# Patient Record
Sex: FEMALE | Race: WHITE | NOT HISPANIC OR LATINO | Employment: FULL TIME | URBAN - METROPOLITAN AREA
[De-identification: names, ages, dates, MRNs, and addresses within clinical notes are randomized per-mention and may not be internally consistent; named-entity substitution may affect disease eponyms.]

---

## 2021-12-02 ENCOUNTER — EVALUATION (OUTPATIENT)
Dept: PHYSICAL THERAPY | Facility: CLINIC | Age: 56
End: 2021-12-02
Payer: COMMERCIAL

## 2021-12-02 VITALS — DIASTOLIC BLOOD PRESSURE: 84 MMHG | SYSTOLIC BLOOD PRESSURE: 126 MMHG

## 2021-12-02 DIAGNOSIS — M25.522 LEFT ELBOW PAIN: ICD-10-CM

## 2021-12-02 DIAGNOSIS — Z48.89 OTHER SPECIFIED AFTERCARE FOLLOWING SURGERY: ICD-10-CM

## 2021-12-02 DIAGNOSIS — S42.411S: Primary | ICD-10-CM

## 2021-12-02 PROBLEM — Z96.653 TOTAL KNEE REPLACEMENT STATUS, BILATERAL: Status: ACTIVE | Noted: 2021-12-02

## 2021-12-02 PROBLEM — I10 HYPERTENSION: Status: ACTIVE | Noted: 2021-12-02

## 2021-12-02 PROCEDURE — 97161 PT EVAL LOW COMPLEX 20 MIN: CPT | Performed by: PHYSICAL THERAPIST

## 2021-12-02 PROCEDURE — 97110 THERAPEUTIC EXERCISES: CPT | Performed by: PHYSICAL THERAPIST

## 2021-12-02 RX ORDER — FLUOXETINE HYDROCHLORIDE 40 MG/1
40 CAPSULE ORAL DAILY
COMMUNITY

## 2021-12-02 RX ORDER — LISINOPRIL 10 MG/1
10 TABLET ORAL DAILY
COMMUNITY

## 2021-12-02 RX ORDER — HYDROCHLOROTHIAZIDE 12.5 MG/1
12.5 CAPSULE, GELATIN COATED ORAL DAILY
COMMUNITY

## 2021-12-06 ENCOUNTER — OFFICE VISIT (OUTPATIENT)
Dept: PHYSICAL THERAPY | Facility: CLINIC | Age: 56
End: 2021-12-06
Payer: COMMERCIAL

## 2021-12-06 DIAGNOSIS — Z48.89 OTHER SPECIFIED AFTERCARE FOLLOWING SURGERY: ICD-10-CM

## 2021-12-06 DIAGNOSIS — S42.411S: Primary | ICD-10-CM

## 2021-12-06 DIAGNOSIS — M25.522 LEFT ELBOW PAIN: ICD-10-CM

## 2021-12-06 PROCEDURE — 97110 THERAPEUTIC EXERCISES: CPT | Performed by: PHYSICAL THERAPIST

## 2021-12-06 PROCEDURE — 97140 MANUAL THERAPY 1/> REGIONS: CPT | Performed by: PHYSICAL THERAPIST

## 2021-12-08 ENCOUNTER — OFFICE VISIT (OUTPATIENT)
Dept: PHYSICAL THERAPY | Facility: CLINIC | Age: 56
End: 2021-12-08
Payer: COMMERCIAL

## 2021-12-08 DIAGNOSIS — S42.411S: Primary | ICD-10-CM

## 2021-12-08 DIAGNOSIS — M25.522 LEFT ELBOW PAIN: ICD-10-CM

## 2021-12-08 DIAGNOSIS — Z48.89 OTHER SPECIFIED AFTERCARE FOLLOWING SURGERY: ICD-10-CM

## 2021-12-08 PROCEDURE — 97140 MANUAL THERAPY 1/> REGIONS: CPT | Performed by: PHYSICAL THERAPIST

## 2021-12-08 PROCEDURE — 97110 THERAPEUTIC EXERCISES: CPT | Performed by: PHYSICAL THERAPIST

## 2021-12-14 ENCOUNTER — APPOINTMENT (OUTPATIENT)
Dept: PHYSICAL THERAPY | Facility: CLINIC | Age: 56
End: 2021-12-14
Payer: COMMERCIAL

## 2021-12-16 ENCOUNTER — OFFICE VISIT (OUTPATIENT)
Dept: PHYSICAL THERAPY | Facility: CLINIC | Age: 56
End: 2021-12-16
Payer: COMMERCIAL

## 2021-12-16 DIAGNOSIS — Z48.89 OTHER SPECIFIED AFTERCARE FOLLOWING SURGERY: ICD-10-CM

## 2021-12-16 DIAGNOSIS — S42.411S: Primary | ICD-10-CM

## 2021-12-16 DIAGNOSIS — M25.522 LEFT ELBOW PAIN: ICD-10-CM

## 2021-12-16 PROCEDURE — 97110 THERAPEUTIC EXERCISES: CPT | Performed by: PHYSICAL THERAPIST

## 2021-12-16 PROCEDURE — 97140 MANUAL THERAPY 1/> REGIONS: CPT | Performed by: PHYSICAL THERAPIST

## 2021-12-16 PROCEDURE — 97530 THERAPEUTIC ACTIVITIES: CPT | Performed by: PHYSICAL THERAPIST

## 2021-12-23 ENCOUNTER — OFFICE VISIT (OUTPATIENT)
Dept: PHYSICAL THERAPY | Facility: CLINIC | Age: 56
End: 2021-12-23
Payer: COMMERCIAL

## 2021-12-23 DIAGNOSIS — S42.411S: Primary | ICD-10-CM

## 2021-12-23 DIAGNOSIS — Z48.89 OTHER SPECIFIED AFTERCARE FOLLOWING SURGERY: ICD-10-CM

## 2021-12-23 DIAGNOSIS — M25.522 LEFT ELBOW PAIN: ICD-10-CM

## 2021-12-23 PROCEDURE — 97140 MANUAL THERAPY 1/> REGIONS: CPT | Performed by: PHYSICAL THERAPIST

## 2021-12-23 PROCEDURE — 97110 THERAPEUTIC EXERCISES: CPT | Performed by: PHYSICAL THERAPIST

## 2021-12-29 ENCOUNTER — APPOINTMENT (OUTPATIENT)
Dept: PHYSICAL THERAPY | Facility: CLINIC | Age: 56
End: 2021-12-29
Payer: COMMERCIAL

## 2022-01-10 ENCOUNTER — OFFICE VISIT (OUTPATIENT)
Dept: PHYSICAL THERAPY | Facility: CLINIC | Age: 57
End: 2022-01-10
Payer: COMMERCIAL

## 2022-01-10 DIAGNOSIS — S42.411S: Primary | ICD-10-CM

## 2022-01-10 DIAGNOSIS — M25.522 LEFT ELBOW PAIN: ICD-10-CM

## 2022-01-10 DIAGNOSIS — Z48.89 OTHER SPECIFIED AFTERCARE FOLLOWING SURGERY: ICD-10-CM

## 2022-01-10 PROCEDURE — 97164 PT RE-EVAL EST PLAN CARE: CPT | Performed by: PHYSICAL THERAPIST

## 2022-01-10 PROCEDURE — 97140 MANUAL THERAPY 1/> REGIONS: CPT | Performed by: PHYSICAL THERAPIST

## 2022-01-10 PROCEDURE — 97110 THERAPEUTIC EXERCISES: CPT | Performed by: PHYSICAL THERAPIST

## 2022-01-10 PROCEDURE — 97530 THERAPEUTIC ACTIVITIES: CPT | Performed by: PHYSICAL THERAPIST

## 2022-01-10 NOTE — LETTER
2022    MD Michael Ramirez [address] [place] [address]    Patient: Merna Kraus   YOB: 1965   Date of Visit: 1/10/2022     Encounter Diagnosis     ICD-10-CM    1  Open fracture distal humerus, bicondylar (T-Y fracture), right, sequela  S42 411S    2  Left elbow pain  M25 522    3  Other specified aftercare following surgery  Z48 89        Dear Dr Mireles Filter:    Thank you for your recent referral of Merna Kraus  Please review the attached evaluation summary from [facility]'s recent visit  Please verify that you agree with the plan of care by signing the attached order  If you have any questions or concerns, please do not hesitate to call  I sincerely appreciate the opportunity to share in the care of one of your patients and hope to have another opportunity to work with you in the near future  Sincerely,    Jose Wood, PT      Referring Provider:      I certify that I have read the below Plan of Care and certify the need for these services furnished under this plan of treatment while under my care  MD Michael Ramirez [address] [place] [address]  Via Fax: [phone]          PT Re-Evaluation     Today's date: 1/10/2022  Patient name: Merna Kraus  : 1965  MRN: [id number]  Referring provider: Gaurav Dan MD  Dx:   Encounter Diagnosis     ICD-10-CM    1  Open fracture distal humerus, bicondylar (T-Y fracture), right, sequela  S42 411S    2  Left elbow pain  M25 522    3  Other specified aftercare following surgery  Z48 89        Start Time: 1635  Stop Time: 1720  Total time in clinic (min): 45 minutes    Assessment  Assessment details: 49year old woman S/P L elbow arthroscopy and debridement, loose bodies removed on 21  L elbow is pain free  Full AROM  Pt RTW on 22 without difficulty  Strength L shoulder/elbow /wrist 4 to 4+/5  L  = to R    Np pain with palpation, hypersensitivity resolved  Pt has resumed all pre-morbid activity  Sleeping well   "feels much better than before surgery  "  DASH 0% disability  Discontinue PT, continue with HEP  Discussed progression of resistive ex and patient given varying resistances of tband  Functional limitations: Pt has resumed all activity  Symptom irritability: highUnderstanding of Dx/Px/POC: excellent  Goals  STG- 4 weeks  1  I HEP (MET)  2  Decrease pain to 0-2/10 (MET)  3  Increase AROM L UE to WNL (MET)  4  Control edema L wrist and hand (MET)  5  RTW (MET)    LTG- 8 weeks  1  Restore strength L elbow/UE to 4+-5/5 in order to resume all pre-morbid activity (progressing)  2  Increase L  strength to % of R in order to resume all pre-morbid activity (MET)  3  Pt will be independent and unrestricted with minimal pain with use of L LE/elbow in all activities related to home, work, and leisure  (MET)  4  Improve DASH score to under 10% disability (MET)    Plan  Plan details: DC to HEP  Patient would benefit from: PT eval and skilled physical therapy  Planned modality interventions: cryotherapy and thermotherapy: hydrocollator packs  Planned therapy interventions: manual therapy, neuromuscular re-education, patient education, therapeutic exercise, therapeutic training and home exercise program  Frequency: 2x week  Treatment plan discussed with: patient        Subjective Evaluation    History of Present Illness  Date of onset: 5/14/2021  Date of surgery: 12/1/2021  Mechanism of injury: surgery  Mechanism of injury: Pt fractured L elbow 5/21  Pt states pain and lack of motion persisted  Pt underwnet surgical debridement loose body removal on 12/1/21  Pt arrived I PO dressing  Dressing extremely tight with hand tingling and  Redness at proximal edge of dressing  Pt referred fro outpatient hand therapy  1/10/22- Patient states L elbow is pain free  No clicking, pleased with progress            Recurrent probem    Quality of life: excellent    Pain  Current pain ratin  At best pain ratin  At worst pain ratin  Progression: improved    Social Support  Steps to enter house: yes  1  Stairs in house: no   Lives in: [place]  Lives with: spouse    Employment status: working (customer service at [employer])  Hand dominance: right      Diagnostic Tests  X-ray: abnormal  Treatments  Previous treatment: physical therapy  Current treatment: physical therapy  Patient Goals  Patient goals for therapy: return to work, independence with ADLs/IADLs, increased strength, decreased pain and increased motion          Objective     Observations     Left Elbow   Positive for incision  Additional Observation Details  4 scope portal incisions, well healed    Neurological Testing     Sensation     Elbow   Left Elbow   Intact: light touch    Right Elbow   Intact: light touch    Comments   Left light touch: all fingers numb and tingly, dressing too tight       Additional Neurological Details  Hypersensitivity resolved    Active Range of Motion   Left Shoulder   Flexion: 148 degrees   Abduction: 145 degrees     Right Shoulder   Normal active range of motion    Left Elbow   Flexion: 136 degrees   Extension: -6 degrees   Forearm supination: 85 degrees   Forearm pronation: 82 degrees     Right Elbow   Normal active range of motion  Elbow hyperextension    Left Wrist   Wrist flexion: 52 degrees   Wrist extension: 62 degrees     Strength/Myotome Testing     Left Shoulder     Planes of Motion   Flexion: 4   Abduction: 4     Right Shoulder   Normal muscle strength    Left Elbow   Flexion: 4+  Extension: 4    Right Elbow   Normal strength    Left Wrist/Hand   Wrist extension: 4+  Wrist flexion: 4     (2nd hand position)     Trial 1: 55    Trial 2: 53    Right Wrist/Hand   Normal wrist strength     (2nd hand position)     Trial 1: 55    Trial 2: 52    Additional Strength Details  Shoulder flx/abd L 4/5    Swelling   Left Elbow Girth Measurements   Joint line: 30 cm    Right Elbow Girth Measurements   Joint line: 30 cm    Left Wrist/Hand   Circumference MCP: 20 5 cm    Right Wrist/Hand   Circumference MCP: 19 cm    General Comments:      Elbow Comments   1/10/22- DASH 0% disability      Flowsheet Rows      Most Recent Value   PT/OT G-Codes    Current Score 98   Projected Score 69   Assessment Type Discharge             Precautions: none      Manuals 12/2 12/6 12/8 12/16 12/23 1/10       Incisional massage after suture removal - STM, sutures still in place  CRR STM, sutures in place    CRR STM, sutures removed  CRR STM  CRR STM   CRR                                     FOTO  FOTO/RE       Neuro Re-Ed             PO dressing removed, redress with dry gauze and COBAN CRR Re-dress with dry gauze and COBAN Re-dress bandaids -                                                                                       Ther Ex             Tendon glides-hook/full 2 x 10 20x/20x 20x/20x 20x/20x 10x/10x -       Wrist F/E in neutral with open hand and fist 20x 20x/20x 20x/20x 20x/20x 20x/20x 1# 20x/20x 2#       Elbow flx forearm neutral 20x 20x 3 positions 20x 3 positions 20x 3 positions 20x 1# 3 positions 20x 3 positions 3#       Sup/pron 10x open hand 10x fist 20x open, 20x fist 20x open hand , 20x fist 20x open hand and wih fist 20x 1# 20x 1# hammer       Shoulder flx/abd 10x/10x 20x/20x 20x/20x 20x/20x 20x/20x 1# 20x/20x 2#       Supine elbow ext - 20x 20x 20x 20x 1# 20x 2#       Protract/clock/counter   20x/20x/20x 20x/20x/20x 20x/20x/20x 20x all 1# -       Isometrics elbow all planes     10x all planes 10x all planes       Ther Activity             putty NV  60" -  2 min  abd/add 20x each set of fingers  pinch thumb to all 10x each HEP -       digiflex NV - Y/R /G/B 10x all, 10x each Y/R/G 10x all, 10x each Y/R/G 10x all, 10x each Y/R/G/B  10x all  10x each       tband shoulder/elbow     NV  shoulder/elbow flx  ext  20x GTB Modalities             prn -

## 2022-01-10 NOTE — PROGRESS NOTES
PT Re-Evaluation     Today's date: 1/10/2022  Patient name: Fatmata Estrella  : 1965  MRN: [id number]  Referring provider: Hu Desouza MD  Dx:   Encounter Diagnosis     ICD-10-CM    1  Open fracture distal humerus, bicondylar (T-Y fracture), right, sequela  S42 411S    2  Left elbow pain  M25 522    3  Other specified aftercare following surgery  Z48 89        Start Time: 1635  Stop Time: 1720  Total time in clinic (min): 45 minutes    Assessment  Assessment details: 49year old woman S/P L elbow arthroscopy and debridement, loose bodies removed on 21  L elbow is pain free  Full AROM  Pt RTW on 22 without difficulty  Strength L shoulder/elbow /wrist 4 to 4+/5  L  = to R  Np pain with palpation, hypersensitivity resolved  Pt has resumed all pre-morbid activity  Sleeping well   "feels much better than before surgery  "  DASH 0% disability  Discontinue PT, continue with HEP  Discussed progression of resistive ex and patient given varying resistances of tband  Functional limitations: Pt has resumed all activity  Symptom irritability: highUnderstanding of Dx/Px/POC: excellent  Goals  STG- 4 weeks  1  I HEP (MET)  2  Decrease pain to 0-2/10 (MET)  3  Increase AROM L UE to WNL (MET)  4  Control edema L wrist and hand (MET)  5  RTW (MET)    LTG- 8 weeks  1  Restore strength L elbow/UE to 4+-5/5 in order to resume all pre-morbid activity (progressing)  2  Increase L  strength to % of R in order to resume all pre-morbid activity (MET)  3  Pt will be independent and unrestricted with minimal pain with use of L LE/elbow in all activities related to home, work, and leisure  (MET)  4   Improve DASH score to under 10% disability (MET)    Plan  Plan details: DC to HEP  Patient would benefit from: PT eval and skilled physical therapy  Planned modality interventions: cryotherapy and thermotherapy: hydrocollator packs  Planned therapy interventions: manual therapy, neuromuscular re-education, patient education, therapeutic exercise, therapeutic training and home exercise program  Frequency: 2x week  Treatment plan discussed with: patient        Subjective Evaluation    History of Present Illness  Date of onset: 2021  Date of surgery: 2021  Mechanism of injury: surgery  Mechanism of injury: Pt fractured L elbow   Pt states pain and lack of motion persisted  Pt underwnet surgical debridement loose body removal on 21  Pt arrived I PO dressing  Dressing extremely tight with hand tingling and  Redness at proximal edge of dressing  Pt referred fro outpatient hand therapy  1/10/22- Patient states L elbow is pain free  No clicking, pleased with progress  Recurrent probem    Quality of life: excellent    Pain  Current pain ratin  At best pain ratin  At worst pain ratin  Progression: improved    Social Support  Steps to enter house: yes  1  Stairs in house: no   Lives in: [facility]  Lives with: spouse    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      Diagnostic Tests  X-ray: abnormal  Treatments  Previous treatment: physical therapy  Current treatment: physical therapy  Patient Goals  Patient goals for therapy: return to work, independence with ADLs/IADLs, increased strength, decreased pain and increased motion          Objective     Observations     Left Elbow   Positive for incision  Additional Observation Details  4 scope portal incisions, well healed    Neurological Testing     Sensation     Elbow   Left Elbow   Intact: light touch    Right Elbow   Intact: light touch    Comments   Left light touch: all fingers numb and tingly, dressing too tight       Additional Neurological Details  Hypersensitivity resolved    Active Range of Motion   Left Shoulder   Flexion: 148 degrees   Abduction: 145 degrees     Right Shoulder   Normal active range of motion    Left Elbow   Flexion: 136 degrees Extension: -6 degrees   Forearm supination: 85 degrees   Forearm pronation: 82 degrees     Right Elbow   Normal active range of motion  Elbow hyperextension    Left Wrist   Wrist flexion: 52 degrees   Wrist extension: 62 degrees     Strength/Myotome Testing     Left Shoulder     Planes of Motion   Flexion: 4   Abduction: 4     Right Shoulder   Normal muscle strength    Left Elbow   Flexion: 4+  Extension: 4    Right Elbow   Normal strength    Left Wrist/Hand   Wrist extension: 4+  Wrist flexion: 4     (2nd hand position)     Trial 1: 55    Trial 2: 53    Right Wrist/Hand   Normal wrist strength     (2nd hand position)     Trial 1: 55    Trial 2: 52    Additional Strength Details  Shoulder flx/abd L 4/5    Swelling   Left Elbow Girth Measurements   Joint line: 30 cm    Right Elbow Girth Measurements   Joint line: 30 cm    Left Wrist/Hand   Circumference MCP: 20 5 cm    Right Wrist/Hand   Circumference MCP: 19 cm    General Comments:      Elbow Comments   1/10/22- DASH 0% disability      Flowsheet Rows      Most Recent Value   PT/OT G-Codes    Current Score 98   Projected Score 69   Assessment Type Discharge             Precautions: none      Manuals 12/2 12/6 12/8 12/16 12/23 1/10       Incisional massage after suture removal - STM, sutures still in place  CRR STM, sutures in place    CRR STM, sutures removed  CRR STM  CRR STM   CRR                                     FOTO  FOTO/RE       Neuro Re-Ed             PO dressing removed, redress with dry gauze and COBAN CRR Re-dress with dry gauze and COBAN Re-dress bandaids -                                                                                       Ther Ex             Tendon glides-hook/full 2 x 10 20x/20x 20x/20x 20x/20x 10x/10x -       Wrist F/E in neutral with open hand and fist 20x 20x/20x 20x/20x 20x/20x 20x/20x 1# 20x/20x 2#       Elbow flx forearm neutral 20x 20x 3 positions 20x 3 positions 20x 3 positions 20x 1# 3 positions 20x 3 positions 3# Sup/pron 10x open hand 10x fist 20x open, 20x fist 20x open hand , 20x fist 20x open hand and wih fist 20x 1# 20x 1# hammer       Shoulder flx/abd 10x/10x 20x/20x 20x/20x 20x/20x 20x/20x 1# 20x/20x 2#       Supine elbow ext - 20x 20x 20x 20x 1# 20x 2#       Protract/clock/counter   20x/20x/20x 20x/20x/20x 20x/20x/20x 20x all 1# -       Isometrics elbow all planes     10x all planes 10x all planes       Ther Activity             putty NV  60" -  2 min  abd/add 20x each set of fingers  pinch thumb to all 10x each HEP -       digiflex NV - Y/R /G/B 10x all, 10x each Y/R/G 10x all, 10x each Y/R/G 10x all, 10x each Y/R/G/B  10x all  10x each       tband shoulder/elbow     NV  shoulder/elbow flx  ext  20x GTB                                 Modalities             prn -

## 2023-03-22 ENCOUNTER — HOSPITAL ENCOUNTER (INPATIENT)
Facility: HOSPITAL | Age: 58
LOS: 5 days | Discharge: HOME HEALTH | End: 2023-03-28
Attending: EMERGENCY MEDICINE | Admitting: HOSPITALIST

## 2023-03-22 DIAGNOSIS — K59.00 CONSTIPATION: ICD-10-CM

## 2023-03-22 DIAGNOSIS — M54.9 INTRACTABLE BACK PAIN: ICD-10-CM

## 2023-03-22 DIAGNOSIS — M54.50 LOW BACK PAIN: Primary | ICD-10-CM

## 2023-03-22 PROBLEM — M54.59 INTRACTABLE LOW BACK PAIN: Status: ACTIVE | Noted: 2023-03-22

## 2023-03-22 PROBLEM — N30.01 ACUTE CYSTITIS WITH HEMATURIA: Status: ACTIVE | Noted: 2023-03-22

## 2023-03-22 PROBLEM — B37.0 THRUSH, ORAL: Status: ACTIVE | Noted: 2023-03-22

## 2023-03-22 PROBLEM — F11.90 CHRONIC, CONTINUOUS USE OF OPIOIDS: Status: ACTIVE | Noted: 2023-03-22

## 2023-03-22 LAB
ANION GAP SERPL CALCULATED.3IONS-SCNC: 7 MMOL/L (ref 4–13)
BACTERIA UR QL AUTO: ABNORMAL /HPF
BASOPHILS # BLD AUTO: 0.03 THOUSANDS/ÂΜL (ref 0–0.1)
BASOPHILS NFR BLD AUTO: 0 % (ref 0–1)
BILIRUB UR QL STRIP: NEGATIVE
BUN SERPL-MCNC: 23 MG/DL (ref 5–25)
CALCIUM SERPL-MCNC: 10.1 MG/DL (ref 8.4–10.2)
CHLORIDE SERPL-SCNC: 99 MMOL/L (ref 96–108)
CLARITY UR: CLEAR
CO2 SERPL-SCNC: 29 MMOL/L (ref 21–32)
COLOR UR: YELLOW
CREAT SERPL-MCNC: 0.96 MG/DL (ref 0.6–1.3)
EOSINOPHIL # BLD AUTO: 0.08 THOUSAND/ÂΜL (ref 0–0.61)
EOSINOPHIL NFR BLD AUTO: 1 % (ref 0–6)
ERYTHROCYTE [DISTWIDTH] IN BLOOD BY AUTOMATED COUNT: 12.5 % (ref 11.6–15.1)
FLUAV RNA RESP QL NAA+PROBE: NEGATIVE
FLUBV RNA RESP QL NAA+PROBE: NEGATIVE
GFR SERPL CREATININE-BSD FRML MDRD: 66 ML/MIN/1.73SQ M
GLUCOSE SERPL-MCNC: 112 MG/DL (ref 65–140)
GLUCOSE UR STRIP-MCNC: NEGATIVE MG/DL
HCT VFR BLD AUTO: 39.7 % (ref 34.8–46.1)
HGB BLD-MCNC: 13 G/DL (ref 11.5–15.4)
HGB UR QL STRIP.AUTO: ABNORMAL
IMM GRANULOCYTES # BLD AUTO: 0.05 THOUSAND/UL (ref 0–0.2)
IMM GRANULOCYTES NFR BLD AUTO: 1 % (ref 0–2)
KETONES UR STRIP-MCNC: NEGATIVE MG/DL
LEUKOCYTE ESTERASE UR QL STRIP: ABNORMAL
LYMPHOCYTES # BLD AUTO: 1.55 THOUSANDS/ÂΜL (ref 0.6–4.47)
LYMPHOCYTES NFR BLD AUTO: 15 % (ref 14–44)
MCH RBC QN AUTO: 31.3 PG (ref 26.8–34.3)
MCHC RBC AUTO-ENTMCNC: 32.7 G/DL (ref 31.4–37.4)
MCV RBC AUTO: 96 FL (ref 82–98)
MONOCYTES # BLD AUTO: 0.85 THOUSAND/ÂΜL (ref 0.17–1.22)
MONOCYTES NFR BLD AUTO: 8 % (ref 4–12)
MUCOUS THREADS UR QL AUTO: ABNORMAL
NEUTROPHILS # BLD AUTO: 8.01 THOUSANDS/ÂΜL (ref 1.85–7.62)
NEUTS SEG NFR BLD AUTO: 75 % (ref 43–75)
NITRITE UR QL STRIP: POSITIVE
NON-SQ EPI CELLS URNS QL MICRO: ABNORMAL /HPF
NRBC BLD AUTO-RTO: 0 /100 WBCS
PH UR STRIP.AUTO: 6 [PH] (ref 4.5–8)
PLATELET # BLD AUTO: 163 THOUSANDS/UL (ref 149–390)
PMV BLD AUTO: 8.8 FL (ref 8.9–12.7)
POTASSIUM SERPL-SCNC: 5 MMOL/L (ref 3.5–5.3)
PROT UR STRIP-MCNC: ABNORMAL MG/DL
RBC # BLD AUTO: 4.15 MILLION/UL (ref 3.81–5.12)
RBC #/AREA URNS AUTO: ABNORMAL /HPF
RSV RNA RESP QL NAA+PROBE: NEGATIVE
SARS-COV-2 RNA RESP QL NAA+PROBE: NEGATIVE
SODIUM SERPL-SCNC: 135 MMOL/L (ref 135–147)
SP GR UR STRIP.AUTO: >=1.03 (ref 1–1.03)
UROBILINOGEN UR QL STRIP.AUTO: 0.2 E.U./DL
WBC # BLD AUTO: 10.57 THOUSAND/UL (ref 4.31–10.16)
WBC #/AREA URNS AUTO: ABNORMAL /HPF

## 2023-03-22 RX ORDER — HYDROMORPHONE HCL/PF 1 MG/ML
1 SYRINGE (ML) INJECTION ONCE
Status: COMPLETED | OUTPATIENT
Start: 2023-03-22 | End: 2023-03-22

## 2023-03-22 RX ORDER — DIAZEPAM 5 MG/1
5 TABLET ORAL EVERY 8 HOURS PRN
Status: DISCONTINUED | OUTPATIENT
Start: 2023-03-22 | End: 2023-03-28 | Stop reason: HOSPADM

## 2023-03-22 RX ORDER — KETOROLAC TROMETHAMINE 30 MG/ML
30 INJECTION, SOLUTION INTRAMUSCULAR; INTRAVENOUS ONCE
Status: COMPLETED | OUTPATIENT
Start: 2023-03-22 | End: 2023-03-22

## 2023-03-22 RX ORDER — DOCUSATE SODIUM 100 MG/1
100 CAPSULE, LIQUID FILLED ORAL 2 TIMES DAILY
Status: DISCONTINUED | OUTPATIENT
Start: 2023-03-22 | End: 2023-03-28 | Stop reason: HOSPADM

## 2023-03-22 RX ORDER — OXYCODONE HYDROCHLORIDE 5 MG/1
5 TABLET ORAL EVERY 4 HOURS PRN
Status: DISCONTINUED | OUTPATIENT
Start: 2023-03-22 | End: 2023-03-25

## 2023-03-22 RX ORDER — GABAPENTIN 300 MG/1
300 CAPSULE ORAL 3 TIMES DAILY
COMMUNITY
Start: 2023-03-19

## 2023-03-22 RX ORDER — KETOROLAC TROMETHAMINE 30 MG/ML
30 INJECTION, SOLUTION INTRAMUSCULAR; INTRAVENOUS EVERY 6 HOURS PRN
Status: DISPENSED | OUTPATIENT
Start: 2023-03-22 | End: 2023-03-24

## 2023-03-22 RX ORDER — FLUCONAZOLE 40 MG/ML
200 POWDER, FOR SUSPENSION ORAL DAILY
Status: DISCONTINUED | OUTPATIENT
Start: 2023-03-23 | End: 2023-03-22

## 2023-03-22 RX ORDER — HYDROMORPHONE HCL/PF 1 MG/ML
0.5 SYRINGE (ML) INJECTION EVERY 2 HOUR PRN
Status: DISCONTINUED | OUTPATIENT
Start: 2023-03-22 | End: 2023-03-27

## 2023-03-22 RX ORDER — GABAPENTIN 300 MG/1
300 CAPSULE ORAL DAILY
Status: DISCONTINUED | OUTPATIENT
Start: 2023-03-23 | End: 2023-03-28 | Stop reason: HOSPADM

## 2023-03-22 RX ORDER — FLUCONAZOLE 100 MG/1
100 TABLET ORAL DAILY
COMMUNITY
End: 2023-03-28

## 2023-03-22 RX ORDER — LISINOPRIL 10 MG/1
10 TABLET ORAL DAILY
Status: DISCONTINUED | OUTPATIENT
Start: 2023-03-23 | End: 2023-03-27

## 2023-03-22 RX ORDER — ENOXAPARIN SODIUM 100 MG/ML
40 INJECTION SUBCUTANEOUS DAILY
Status: DISCONTINUED | OUTPATIENT
Start: 2023-03-23 | End: 2023-03-28 | Stop reason: HOSPADM

## 2023-03-22 RX ORDER — ONDANSETRON 2 MG/ML
4 INJECTION INTRAMUSCULAR; INTRAVENOUS EVERY 6 HOURS PRN
Status: DISCONTINUED | OUTPATIENT
Start: 2023-03-22 | End: 2023-03-28 | Stop reason: HOSPADM

## 2023-03-22 RX ORDER — MAGNESIUM HYDROXIDE/ALUMINUM HYDROXICE/SIMETHICONE 120; 1200; 1200 MG/30ML; MG/30ML; MG/30ML
30 SUSPENSION ORAL EVERY 6 HOURS PRN
Status: DISCONTINUED | OUTPATIENT
Start: 2023-03-22 | End: 2023-03-28 | Stop reason: HOSPADM

## 2023-03-22 RX ORDER — ACETAMINOPHEN 325 MG/1
650 TABLET ORAL EVERY 6 HOURS PRN
Status: DISCONTINUED | OUTPATIENT
Start: 2023-03-22 | End: 2023-03-28 | Stop reason: HOSPADM

## 2023-03-22 RX ORDER — FLUCONAZOLE 100 MG/1
100 TABLET ORAL DAILY
Status: DISCONTINUED | OUTPATIENT
Start: 2023-03-23 | End: 2023-03-28 | Stop reason: HOSPADM

## 2023-03-22 RX ORDER — DIAZEPAM 2 MG/1
5 TABLET ORAL DAILY PRN
COMMUNITY
Start: 2023-03-19 | End: 2023-03-28

## 2023-03-22 RX ORDER — CEFTRIAXONE 2 G/50ML
2000 INJECTION, SOLUTION INTRAVENOUS EVERY 24 HOURS
Status: DISCONTINUED | OUTPATIENT
Start: 2023-03-22 | End: 2023-03-26 | Stop reason: SDUPTHER

## 2023-03-22 RX ORDER — HYDROXYCHLOROQUINE SULFATE 200 MG/1
200 TABLET, FILM COATED ORAL 2 TIMES DAILY
COMMUNITY

## 2023-03-22 RX ORDER — OXYCODONE HYDROCHLORIDE 5 MG/1
5 TABLET ORAL AS NEEDED
Status: ON HOLD | COMMUNITY
Start: 2023-03-19 | End: 2023-03-28 | Stop reason: SDUPTHER

## 2023-03-22 RX ORDER — BACLOFEN 10 MG/1
10 TABLET ORAL 3 TIMES DAILY
Status: DISCONTINUED | OUTPATIENT
Start: 2023-03-22 | End: 2023-03-28 | Stop reason: HOSPADM

## 2023-03-22 RX ORDER — HYDROXYCHLOROQUINE SULFATE 200 MG/1
200 TABLET, FILM COATED ORAL 2 TIMES DAILY
Status: DISCONTINUED | OUTPATIENT
Start: 2023-03-22 | End: 2023-03-28 | Stop reason: HOSPADM

## 2023-03-22 RX ADMIN — HYDROMORPHONE HYDROCHLORIDE 0.5 MG: 1 INJECTION, SOLUTION INTRAMUSCULAR; INTRAVENOUS; SUBCUTANEOUS at 23:42

## 2023-03-22 RX ADMIN — CEFTRIAXONE 2000 MG: 2 INJECTION, SOLUTION INTRAVENOUS at 23:01

## 2023-03-22 RX ADMIN — BACLOFEN 10 MG: 10 TABLET ORAL at 22:04

## 2023-03-22 RX ADMIN — HYDROMORPHONE HYDROCHLORIDE 1 MG: 1 INJECTION, SOLUTION INTRAMUSCULAR; INTRAVENOUS; SUBCUTANEOUS at 17:43

## 2023-03-22 RX ADMIN — HYDROMORPHONE HYDROCHLORIDE 1 MG: 1 INJECTION, SOLUTION INTRAMUSCULAR; INTRAVENOUS; SUBCUTANEOUS at 16:05

## 2023-03-22 RX ADMIN — DOCUSATE SODIUM 100 MG: 100 CAPSULE, LIQUID FILLED ORAL at 22:04

## 2023-03-22 RX ADMIN — KETOROLAC TROMETHAMINE 30 MG: 30 INJECTION, SOLUTION INTRAMUSCULAR; INTRAVENOUS at 16:04

## 2023-03-22 RX ADMIN — HYDROXYCHLOROQUINE SULFATE 200 MG: 200 TABLET ORAL at 23:01

## 2023-03-22 RX ADMIN — KETOROLAC TROMETHAMINE 30 MG: 30 INJECTION, SOLUTION INTRAMUSCULAR; INTRAVENOUS at 22:03

## 2023-03-22 NOTE — ED PROVIDER NOTES
History  Chief Complaint   Patient presents with   • Back Pain     Pt reports lower back pain since 2/1/23, pt reports she has been in and out of the hospital for severe lower back pain       History provided by:  Patient   used: No    Back Pain  Location:  Lumbar spine  Quality:  Aching  Pain severity:  Moderate  Pain is:  Same all the time  Onset quality:  Gradual  Duration:  1 month  Timing:  Intermittent  Progression:  Waxing and waning  Chronicity:  Recurrent  Context comment:  Lumbar back pain going on for past month, recent MRI with degenerative changes, seen Pain Management recently,  Relieved by:  Nothing  Worsened by:  Nothing  Ineffective treatments:  None tried  Associated symptoms: no abdominal pain, no bladder incontinence, no bowel incontinence, no chest pain, no dysuria, no fever, no headaches, no leg pain, no perianal numbness and no weakness    Risk factors comment:  Back pain      Prior to Admission Medications   Prescriptions Last Dose Informant Patient Reported? Taking? FLUoxetine (PROzac) 40 MG capsule   Yes No   Sig: Take 40 mg by mouth daily   hydrochlorothiazide (MICROZIDE) 12 5 mg capsule   Yes No   Sig: Take 12 5 mg by mouth daily   lisinopril (ZESTRIL) 10 mg tablet   Yes No   Sig: Take 10 mg by mouth daily      Facility-Administered Medications: None       History reviewed  No pertinent past medical history  History reviewed  No pertinent surgical history  History reviewed  No pertinent family history  I have reviewed and agree with the history as documented  E-Cigarette/Vaping     E-Cigarette/Vaping Substances     Social History     Tobacco Use   • Smoking status: Never   • Smokeless tobacco: Never       Review of Systems   Constitutional: Negative for chills and fever  HENT: Negative for facial swelling, sore throat and trouble swallowing  Eyes: Negative for pain and visual disturbance  Respiratory: Negative for cough and shortness of breath  Cardiovascular: Negative for chest pain and leg swelling  Gastrointestinal: Negative for abdominal pain, bowel incontinence, diarrhea, nausea and vomiting  Genitourinary: Negative for bladder incontinence, dysuria and flank pain  Musculoskeletal: Positive for back pain  Negative for neck pain and neck stiffness  Skin: Negative for pallor and rash  Allergic/Immunologic: Negative for environmental allergies and immunocompromised state  Neurological: Negative for dizziness, weakness and headaches  Hematological: Negative for adenopathy  Does not bruise/bleed easily  Psychiatric/Behavioral: Negative for agitation and behavioral problems  All other systems reviewed and are negative  Physical Exam  Physical Exam  Vitals and nursing note reviewed  Constitutional:       General: She is not in acute distress  Appearance: She is well-developed  HENT:      Head: Normocephalic and atraumatic  Eyes:      Extraocular Movements: Extraocular movements intact  Cardiovascular:      Rate and Rhythm: Normal rate and regular rhythm  Heart sounds: Normal heart sounds  Pulmonary:      Effort: Pulmonary effort is normal       Breath sounds: Normal breath sounds  Abdominal:      Palpations: Abdomen is soft  Tenderness: There is no abdominal tenderness  There is no guarding or rebound  Musculoskeletal:         General: No tenderness  Normal range of motion  Cervical back: Normal range of motion and neck supple  Right lower leg: No edema  Left lower leg: No edema  Comments: No focal lumbar spine tenderness or deformity, motor/sensory exam intact distally in bilateral lower extremities   Skin:     General: Skin is warm and dry  Neurological:      General: No focal deficit present  Mental Status: She is alert and oriented to person, place, and time     Psychiatric:         Mood and Affect: Mood normal          Behavior: Behavior normal          Vital Signs  ED Triage Vitals   Temperature Pulse Respirations Blood Pressure SpO2   03/22/23 1514 03/22/23 1514 03/22/23 1514 03/22/23 1514 03/22/23 1514   98 3 °F (36 8 °C) (!) 120 18 (!) 148/105 95 %      Temp Source Heart Rate Source Patient Position - Orthostatic VS BP Location FiO2 (%)   03/22/23 1514 03/22/23 1514 -- 03/22/23 1653 --   Oral Monitor  Left arm       Pain Score       03/22/23 1514       10 - Worst Possible Pain           Vitals:    03/22/23 1514 03/22/23 1653   BP: (!) 148/105 116/67   Pulse: (!) 120 102         Visual Acuity      ED Medications  Medications   HYDROmorphone (DILAUDID) injection 1 mg (1 mg Intravenous Given 3/22/23 1605)   ketorolac (TORADOL) injection 30 mg (30 mg Intravenous Given 3/22/23 1604)   HYDROmorphone (DILAUDID) injection 1 mg (1 mg Intravenous Given 3/22/23 1743)       Diagnostic Studies  Results Reviewed     Procedure Component Value Units Date/Time    Urine Microscopic [[id number]] Collected: 03/22/23 1722    Lab Status:  In process Specimen: Urine, Clean Catch Updated: 03/22/23 1730    Urine Macroscopic, POC [[id number]]  (Abnormal) Collected: 03/22/23 1722    Lab Status: Final result Specimen: Urine Updated: 03/22/23 1724     Color, UA Yellow     Clarity, UA Clear     pH, UA 6 0     Leukocytes, UA Trace     Nitrite, UA Positive     Protein, UA 30 (1+) mg/dl      Glucose, UA Negative mg/dl      Ketones, UA Negative mg/dl      Urobilinogen, UA 0 2 E U /dl      Bilirubin, UA Negative     Occult Blood, UA Moderate     Specific Gravity, UA >=1 030    Narrative:      CLINITEK RESULT    Basic metabolic panel [[id number]] Collected: 03/22/23 1602    Lab Status: Final result Specimen: Blood from Arm, Right Updated: 03/22/23 1625     Sodium 135 mmol/L      Potassium 5 0 mmol/L      Chloride 99 mmol/L      CO2 29 mmol/L      ANION GAP 7 mmol/L      BUN 23 mg/dL      Creatinine 0 96 mg/dL      Glucose 112 mg/dL      Calcium 10 1 mg/dL      eGFR 66 ml/min/1 73sq m     Narrative:      National Kid[employer] Disease Foundation guidelines for Chronic Kidney Disease (CKD):   •  Stage 1 with normal or high GFR (GFR > 90 mL/min/1 73 square meters)  •  Stage 2 Mild CKD (GFR = 60-89 mL/min/1 73 square meters)  •  Stage 3A Moderate CKD (GFR = 45-59 mL/min/1 73 square meters)  •  Stage 3B Moderate CKD (GFR = 30-44 mL/min/1 73 square meters)  •  Stage 4 Severe CKD (GFR = 15-29 mL/min/1 73 square meters)  •  Stage 5 End Stage CKD (GFR <15 mL/min/1 73 square meters)  Note: GFR calculation is accurate only with a steady state creatinine    CBC and differential [[id number]]  (Abnormal) Collected: 03/22/23 1602    Lab Status: Final result Specimen: Blood from Arm, Right Updated: 03/22/23 1612     WBC 10 57 Thousand/uL      RBC 4 15 Million/uL      Hemoglobin 13 0 g/dL      Hematocrit 39 7 %      MCV 96 fL      MCH 31 3 pg      MCHC 32 7 g/dL      RDW 12 5 %      MPV 8 8 fL      Platelets 976 Thousands/uL      nRBC 0 /100 WBCs      Neutrophils Relative 75 %      Immat GRANS % 1 %      Lymphocytes Relative 15 %      Monocytes Relative 8 %      Eosinophils Relative 1 %      Basophils Relative 0 %      Neutrophils Absolute 8 01 Thousands/µL      Immature Grans Absolute 0 05 Thousand/uL      Lymphocytes Absolute 1 55 Thousands/µL      Monocytes Absolute 0 85 Thousand/µL      Eosinophils Absolute 0 08 Thousand/µL      Basophils Absolute 0 03 Thousands/µL                  No orders to display              Procedures  Procedures         ED Course  ED Course as of 03/22/23 1815   Wed Mar 22, 2023   1650 WBC(!): 10 57   1650 Hemoglobin: 13 0   1650 Platelet Count: 721   1650 Sodium: 135   1650 Potassium: 5 0   1650 BUN: 23   1650 Creatinine: 0 96  Labs reviewed  [phone] Patient re-evaluated, still with significant pain, we will admit for pain control, observation  1730 Leukocytes, UA(!): Trace   1730 Nitrite, UA(!): Positive  Urine noted for trace leucs and nitrites       Note: Urine results discussed with Dr Walker Bruno, agree to hold off on Abx pending Micro/Cx results  Medical Decision Making  Patient is a 16-year-old male, comes in with acute on chronic back pain, patient states that she has worsening of her lumbar back pain since February last month, patient had recent MRI that showed multilevel degenerative changes with nerve impingement at right L3 and possible left L4, recently seen pain management at [facility], now comes with persistent/recurrent back pain, patient states that her last bowel movement was 1 week back which she attributes to the pain meds she has been taking, denies bowel or bladder incontinence, no numbness or tingling in extremities  On exam, patient is conscious, alert, vital signs are stable, no acute distress, abdomen is soft nontender, no focal lumbar spine tenderness, motor/sensory exam intact distally in bilateral lower extremities  Impression: Acute on chronic back pain, we will give pain meds  Low back pain: acute illness or injury  Amount and/or Complexity of Data Reviewed  Labs: ordered  Decision-making details documented in ED Course  Risk  Prescription drug management            Disposition  Final diagnoses:   Low back pain   Intractable back pain     Time reflects when diagnosis was documented in both MDM as applicable and the Disposition within this note     Time User Action Codes Description Comment    3/22/2023  3:55 PM Paralee Myrna Add [M54 50] Low back pain     3/22/2023  5:18 PM Anne Son [M54 9] Intractable back pain       ED Disposition     ED Disposition   Admit    Condition   Stable    Date/Time   Wed Mar 22, 2023  5:17 PM    Comment   Case was discussed with Dr April Kilgore and the patient's admission status was agreed to be Admission Status: observation status to the service of Dr April Kilgore            Follow-up Information    None         Patient's Medications   Discharge Prescriptions    No medications on file       No discharge procedures on file      PDMP Review     None          ED Provider  Electronically Signed by           Blair Casiano MD  03/22/23 [id number]

## 2023-03-22 NOTE — LETTER
[id number]  [address]  Dept: [phone]    March 28, 2023     Patient: Kimberley Fox   YOB: 1965   Date of Visit: 3/22/2023       To Whom it May Concern:    Kimberley Fox is under my professional care  She was seen in the hospital from 3/22/2023 to 03/28/23  She may return to work on 4/3 without limitations  If you have any questions or concerns, please don't hesitate to call           Sincerely,          Jignesh Tamazight, DO

## 2023-03-23 LAB
ANION GAP SERPL CALCULATED.3IONS-SCNC: 8 MMOL/L (ref 4–13)
BASOPHILS # BLD AUTO: 0.04 THOUSANDS/ÂΜL (ref 0–0.1)
BASOPHILS NFR BLD AUTO: 0 % (ref 0–1)
BUN SERPL-MCNC: 25 MG/DL (ref 5–25)
CALCIUM SERPL-MCNC: 10.2 MG/DL (ref 8.4–10.2)
CHLORIDE SERPL-SCNC: 97 MMOL/L (ref 96–108)
CO2 SERPL-SCNC: 30 MMOL/L (ref 21–32)
CREAT SERPL-MCNC: 0.86 MG/DL (ref 0.6–1.3)
EOSINOPHIL # BLD AUTO: 0.1 THOUSAND/ÂΜL (ref 0–0.61)
EOSINOPHIL NFR BLD AUTO: 1 % (ref 0–6)
ERYTHROCYTE [DISTWIDTH] IN BLOOD BY AUTOMATED COUNT: 12.4 % (ref 11.6–15.1)
GFR SERPL CREATININE-BSD FRML MDRD: 76 ML/MIN/1.73SQ M
GLUCOSE P FAST SERPL-MCNC: 98 MG/DL (ref 65–99)
GLUCOSE SERPL-MCNC: 98 MG/DL (ref 65–140)
HCT VFR BLD AUTO: 38.4 % (ref 34.8–46.1)
HGB BLD-MCNC: 12.5 G/DL (ref 11.5–15.4)
IMM GRANULOCYTES # BLD AUTO: 0.05 THOUSAND/UL (ref 0–0.2)
IMM GRANULOCYTES NFR BLD AUTO: 1 % (ref 0–2)
LYMPHOCYTES # BLD AUTO: 1.93 THOUSANDS/ÂΜL (ref 0.6–4.47)
LYMPHOCYTES NFR BLD AUTO: 21 % (ref 14–44)
MCH RBC QN AUTO: 31.6 PG (ref 26.8–34.3)
MCHC RBC AUTO-ENTMCNC: 32.6 G/DL (ref 31.4–37.4)
MCV RBC AUTO: 97 FL (ref 82–98)
MONOCYTES # BLD AUTO: 0.73 THOUSAND/ÂΜL (ref 0.17–1.22)
MONOCYTES NFR BLD AUTO: 8 % (ref 4–12)
NEUTROPHILS # BLD AUTO: 6.54 THOUSANDS/ÂΜL (ref 1.85–7.62)
NEUTS SEG NFR BLD AUTO: 69 % (ref 43–75)
NRBC BLD AUTO-RTO: 0 /100 WBCS
PLATELET # BLD AUTO: 191 THOUSANDS/UL (ref 149–390)
PMV BLD AUTO: 9.3 FL (ref 8.9–12.7)
POTASSIUM SERPL-SCNC: 4.4 MMOL/L (ref 3.5–5.3)
RBC # BLD AUTO: 3.96 MILLION/UL (ref 3.81–5.12)
SODIUM SERPL-SCNC: 135 MMOL/L (ref 135–147)
WBC # BLD AUTO: 9.39 THOUSAND/UL (ref 4.31–10.16)

## 2023-03-23 RX ADMIN — HYDROXYCHLOROQUINE SULFATE 200 MG: 200 TABLET ORAL at 16:11

## 2023-03-23 RX ADMIN — KETOROLAC TROMETHAMINE 30 MG: 30 INJECTION, SOLUTION INTRAMUSCULAR; INTRAVENOUS at 07:37

## 2023-03-23 RX ADMIN — CEFTRIAXONE 2000 MG: 2 INJECTION, SOLUTION INTRAVENOUS at 21:40

## 2023-03-23 RX ADMIN — LISINOPRIL 10 MG: 10 TABLET ORAL at 08:00

## 2023-03-23 RX ADMIN — DOCUSATE SODIUM 100 MG: 100 CAPSULE, LIQUID FILLED ORAL at 16:10

## 2023-03-23 RX ADMIN — HYDROMORPHONE HYDROCHLORIDE 0.5 MG: 1 INJECTION, SOLUTION INTRAMUSCULAR; INTRAVENOUS; SUBCUTANEOUS at 19:55

## 2023-03-23 RX ADMIN — HYDROXYCHLOROQUINE SULFATE 200 MG: 200 TABLET ORAL at 07:39

## 2023-03-23 RX ADMIN — GABAPENTIN 300 MG: 300 CAPSULE ORAL at 08:00

## 2023-03-23 RX ADMIN — HYDROMORPHONE HYDROCHLORIDE 0.5 MG: 1 INJECTION, SOLUTION INTRAMUSCULAR; INTRAVENOUS; SUBCUTANEOUS at 12:53

## 2023-03-23 RX ADMIN — BACLOFEN 10 MG: 10 TABLET ORAL at 07:39

## 2023-03-23 RX ADMIN — KETOROLAC TROMETHAMINE 30 MG: 30 INJECTION, SOLUTION INTRAMUSCULAR; INTRAVENOUS at 14:20

## 2023-03-23 RX ADMIN — DOCUSATE SODIUM 100 MG: 100 CAPSULE, LIQUID FILLED ORAL at 07:40

## 2023-03-23 RX ADMIN — OXYCODONE HYDROCHLORIDE 5 MG: 5 TABLET ORAL at 22:40

## 2023-03-23 RX ADMIN — HYDROMORPHONE HYDROCHLORIDE 0.5 MG: 1 INJECTION, SOLUTION INTRAMUSCULAR; INTRAVENOUS; SUBCUTANEOUS at 06:14

## 2023-03-23 RX ADMIN — OXYCODONE HYDROCHLORIDE 5 MG: 5 TABLET ORAL at 16:11

## 2023-03-23 RX ADMIN — ENOXAPARIN SODIUM 40 MG: 100 INJECTION SUBCUTANEOUS at 08:00

## 2023-03-23 RX ADMIN — BACLOFEN 10 MG: 10 TABLET ORAL at 16:11

## 2023-03-23 RX ADMIN — FLUCONAZOLE 100 MG: 100 TABLET ORAL at 08:00

## 2023-03-23 RX ADMIN — OXYCODONE HYDROCHLORIDE 5 MG: 5 TABLET ORAL at 10:21

## 2023-03-23 RX ADMIN — HYDROMORPHONE HYDROCHLORIDE 0.5 MG: 1 INJECTION, SOLUTION INTRAMUSCULAR; INTRAVENOUS; SUBCUTANEOUS at 03:10

## 2023-03-23 RX ADMIN — OXYCODONE HYDROCHLORIDE 5 MG: 5 TABLET ORAL at 05:19

## 2023-03-23 RX ADMIN — BACLOFEN 10 MG: 10 TABLET ORAL at 21:40

## 2023-03-23 RX ADMIN — DIAZEPAM 5 MG: 5 TABLET ORAL at 07:39

## 2023-03-23 NOTE — PLAN OF CARE
"  Problem: OCCUPATIONAL THERAPY ADULT  Goal: Performs self-care activities at highest level of function for planned discharge setting  See evaluation for individualized goals  Description: Treatment Interventions: ADL retraining, Functional transfer training, UE strengthening/ROM, Endurance training, Patient/family training, Equipment evaluation/education, Neuromuscular reeducation, Compensatory technique education, Energy conservation, Activityengagement          See flowsheet documentation for full assessment, interventions and recommendations  Note: Limitation: Decreased ADL status, Decreased UE strength, Decreased UE ROM, Decreased Safe judgement during ADL, Decreased endurance, Decreased self-care trans  Prognosis: Good  Assessment: Alaina Arguelles is a 56 y o  female who presents with severe lumbar spine pain  MRI 3/16 done by [facility] demonstrating \"Multilevel lumbar spondylosis  Spinal canal narrowing including a mild to moderate spinal canal stenosis at L2-L3  Multilevel neural foraminal narrowing including a moderate right L3-L4 neural foraminal stenosis  Impingement of the exiting right L3 nerve root  Possible impingement of the exiting left L4 nerve root  Small left foraminal protrusion at L5-S1\"  Pt with active orders for OT and up with assistance  Prior to admission, pt lives with  and mother in law in a ranch style home with 2 IHSAN with rails  Home has a walk in shower with seat and grab bars, raised toilets  Since Feb when back pain got worse, pt Has been ambulating with RW,  A with ADLS and IADLs  has been sleeping in adjustable bed  Drives  [employer]  reports 0 falls in the past 6 months  Upon evaluation, pt presenting below functional baseline with deficits noted in strength, activity tolerance, balance, safety awareness  which is limiting pts functional ability to complete ADLs/ IADLs, functional transfers and functional mobility   Pt current level of function: bed mobility - " supervision; transfers- supervision; functional mobility-supervision with RW ; UB dressing / bathing - supervision; LB dressing/ bathing- Dany; toileting - supervision  Pt would benefit from skilled OT 2-3x/wk to maximize functional independence  OT DC recommendation: OPPT       OT Discharge Recommendation: Home with outpatient rehabilitation

## 2023-03-23 NOTE — PLAN OF CARE
Problem: PHYSICAL THERAPY ADULT  Goal: Performs mobility at highest level of function for planned discharge setting  See evaluation for individualized goals  Description: Treatment/Interventions: Functional transfer training, LE strengthening/ROM, Therapeutic exercise, Endurance training, Patient/family training, Bed mobility, Gait training, Spoke to nursing, OT, Elevations          See flowsheet documentation for full assessment, interventions and recommendations  Note: Prognosis: Fair  Problem List: Decreased strength, Decreased endurance, Impaired balance, Decreased mobility, Pain  Assessment: Pt 56 y o  female w/ known opioid use and HTN presented to [address] on 3/22/2023 w/ severe LBP  Episode of LBP began early Feb '23 w/ multiple hospital visits w/ increasing pain  See EMR Pt admitted for Intractable low back pain, acute cystitis, and oral thrush  PT eval and tx order w/ up w/ A placed  PTA, pt was independent w/ all functional mobility w/ RW and lives w/  and mother-in-law in 1 level home  Upon evaluation, pt exhibits weakness, impaired balance, decreased endurance and pain as noted in flow sheet  Pt demonstrated bed mobility w/ Mod I and transfers w/ Supervision requiring verbal cuing for proper mechanics and safety  Pt was able to ambulate using Rolling Walker w/ Supervision   Observable gait deviations as noted in flowsheet  During ambulation, no gross LOB and no complaints of dizziness, lightheadedness or SOB  Ambulation limited by pain which increased w/ mobility and ambulation  The above mentioned impairments limit pt's ability for independent functional mobility hence will benefit from skilled PT during hospital stay to improve function  The patient's AM-PAC Basic Mobility Inpatient Short Form Raw Score is 19   A Raw score of greater than 16 suggests the patient may benefit from discharge to home   Please also refer to the recommendation of the Physical Therapist for safe discharge planning  PT will recommend home with outpatient rehabilitation upon d/c from acute care once medically managed to improve functional mobility to baseline  Education provided to pt regarding importance of PT  Continue to encourage mobilization w/ nursing including OOB for meals as tolerated to prevent further functional decline  Nursing notified  Pt tolerated session well  Pt at end of session, in stable condition, supine in bed with all needs within reach  Co-eval with OT necessary for pt's best interest and medical complexity  Barriers to Discharge: None     PT Discharge Recommendation: Home with outpatient rehabilitation    See flowsheet documentation for full assessment

## 2023-03-23 NOTE — H&P
"[address] 1965, 56 y o  female MRN: [id number]  Unit/Bed#: [address] [id number] Encounter: [id number]  Primary Care Provider: Mary Jo Reeves DO   Date and time admitted to hospital: 3/22/2023  3:17 PM    * Intractable low back pain  Assessment & Plan  · Patient with PMHx of intractable lower back pain followed outpatient by [facility] presents to the ED today with persistent low back pain radiating into bilateral sides, L>R initially 10/10, now 5/10  · Denies urinary/bowel incontinence, saddle paresthesias, leg numbness or weakness  · Patient recently admitted to [facility] for the same issue 3/16-3/19   · Patient reports recently seeing [facility] earlier in the week, who switched her medication regimen  · Weaning off gabapentin now taking 300mg once daily x 2 more days, initiated 300mg etodolac q8 hours, but patient reports only taking one dose of this medication so far, valium increased to 5mg prn spasm  · Recommending spinal ablation with consultation scheduled April 21  · MRI 3/16 done by [facility] demonstrating \"Multilevel lumbar spondylosis  Spinal canal narrowing including a mild to moderate spinal canal stenosis at L2-L3  Multilevel neural foraminal narrowing including a moderate right L3-L4 neural   foraminal stenosis  Impingement of the exiting right L3 nerve root  Possible impingement of the   exiting left L4 nerve root  Small left foraminal protrusion at L5-S1 which does not clearly impinge the   exiting left L5 nerve root  Diffuse nonspecific marrow heterogeneity  Differential considerations include   hematopoetic marrow hyperplasia related to anemia, prior chemotherapy, chronic hypoxia, or erythropoietin administration, although a marrow replacement process can appear similarly  Correlation with patient's history and clinical presentation recommended  \"  · Continue pain management   · Orthopedic surgery consulted  · Patient report pain so severe " "walks with walker at baseline, PT/OT consulted    Acute cystitis with hematuria  Assessment & Plan  · Patient reports that she notices some \"pink\" when she urinated earlier, but denies other symptoms such as dysuria, urgency, frequency, urinary retention  · Patient reports has had kidney stones in the past, but back pain her normal chronic issue, not flank pain, typical kidney stone related pain for her  · Last CT abdomen/pelvis 2/9/2022 demonstrating \"There are multiple small bilateral renal calculi  Largest   calculus in the right kidney is in the lower pole measuring 3 8 mm  Small calculi seen in the left kidney  No hydronephrosis  No hydroureter or ureteral calculus  \"  · WBC 10 57, afebrile  · Will initiate ceftriaxone, continue to monitor     Chronic, continuous use of opioids  Assessment & Plan  · PDMP reviewed   · Continue home regimen of 5mg valium and 5mg oxy  · Continue pain management     Thrush, oral  Assessment & Plan  · Patient reports she recently started on fluconazole once daily for issue   · She states she has been on two long tapers of steroids for her back pain, likely the cause   · Continue fluconazole     Hypertension  Assessment & Plan  · Continue home medications    VTE Pharmacologic Prophylaxis: VTE Score: 3 Moderate Risk (Score 3-4) - Pharmacological DVT Prophylaxis Ordered: enoxaparin (Lovenox)  Code Status: Level 1 - Full Code   Discussion with family: Patient declined call to   Anticipated Length of Stay: Patient will be admitted on an observation basis with an anticipated length of stay of less than 2 midnights secondary to intractable back pain      Total Time Spent on Date of Encounter in care of patient: 75 minutes This time was spent on one or more of the following: performing physical exam; counseling and coordination of care; obtaining or reviewing history; documenting in the medical record; reviewing/ordering tests, medications or procedures; communicating with " "other healthcare professionals and discussing with patient's family/caregivers  Chief Complaint: \"The pain is so severe, I don't think I can wait until my apartment in April\"    History of Present Illness:  Erasmo Bowen is a 56 y o  female who presents with severe lumbar spine pain  Patient reports that she has had severe lower back pain gradually worsening, but leading to repeat hospitalization x 2 months  Now the pain is so bad that she now walks with a walker at baseline  Patient was hospitalized at [facility] last week and then had follow-up with [facility] earlier this week who adjusted her medication regimen  They report that they think her pain is more arthritic in nature than nerve related and are currently weaning her gabapentin to etodolac  They also increased her Valium from 2 mg to 5 mg for muscle spasms and increased her Oxy codon from 2 mg to 5 mg as well  Patient reports that she has only taken 1 day worth of the etodolac, but the pain was unsustainable she decided to seek emergent care  She was seen by [facility] emergency room earlier today who gave her a pain cocktail and sent her home at that time  Patient reports that when she got home and these medications wore off the pain was too much so she decided to seek urgent care again  She reports that she has a follow-up with the [facility] at the end of April for a consultation for possible spinal ablation, but is worried that she cannot last until then  Patient reports that the pain is a 10 out of 10 and worse with movement  She reports that after receiving Toradol and Dilaudid it is now 5 out of 10 but increasing again  She states that her pain is central over her lumbar spine and radiates to her bilateral sides with it more consistently radiating to the left  She denies any bilateral leg weakness or numbness and just reports generalized weakness secondary to deconditioning from her chronic pain    She denies saddle paresthesias, " urinary or bowel incontinence  Patient reports that she does have some hematuria, but denies any dysuria, urgency, frequency, retention  Patient with history of kidney stones but reports that her pain is the same chronic back pain that she always has been on like her usual kidney stone pain  Patient also reports oral thrush likely secondary to her to prolonged steroid tapers in an attempt to control her back pain  Review of Systems:  Review of Systems   Constitutional: Negative for appetite change, chills, diaphoresis, fatigue and fever  HENT: Negative for congestion, rhinorrhea and sore throat  Eyes: Negative for photophobia and visual disturbance  Respiratory: Negative for cough, shortness of breath and wheezing  Cardiovascular: Negative for chest pain, palpitations and leg swelling  Gastrointestinal: Negative for abdominal distention, abdominal pain, blood in stool, constipation, diarrhea, nausea and vomiting  Genitourinary: Positive for hematuria  Negative for decreased urine volume, difficulty urinating, dysuria, flank pain and urgency  Musculoskeletal: Positive for back pain (Central lumbar spine radiating to both sides L>R) and gait problem (Patient walks with cane secondary to pain)  Negative for arthralgias, myalgias and neck stiffness  Skin: Negative for color change and rash  Neurological: Positive for weakness (generalized, deconditioning )  Negative for dizziness, seizures, syncope, light-headedness and headaches  Psychiatric/Behavioral: Negative for agitation, behavioral problems and confusion  The patient is not nervous/anxious  All other systems reviewed and are negative  Past Medical and Surgical History:   History reviewed  No pertinent past medical history  History reviewed  No pertinent surgical history  Meds/Allergies:  Prior to Admission medications    Medication Sig Start Date End Date Taking?  Authorizing Provider   diazepam (VALIUM) 2 mg tablet Take 5 mg by mouth daily as needed 3/19/23  Yes Historical Provider, MD   etodolac (LODINE) 300 MG capsule Take 300 mg by mouth every 8 (eight) hours   Yes Historical Provider, MD   fluconazole (DIFLUCAN) 100 mg tablet Take 100 mg by mouth daily   Yes Historical Provider, MD   gabapentin (NEURONTIN) 300 mg capsule Take 300 mg by mouth 3 (three) times a day Pt states she is weaning off this medication 3/19/23  Yes Historical Provider, MD   hydrochlorothiazide (MICROZIDE) 12 5 mg capsule Take 25 mg by mouth daily   Yes Historical Provider, MD   hydroxychloroquine (PLAQUENIL) 200 mg tablet Take 200 mg by mouth 2 (two) times a day   Yes Historical Provider, MD   lisinopril (ZESTRIL) 10 mg tablet Take 10 mg by mouth daily   Yes Historical Provider, MD   oxyCODONE (ROXICODONE) 5 immediate release tablet Take 5 mg by mouth if needed 3/19/23  Yes Historical Provider, MD   FLUoxetine (PROzac) 40 MG capsule Take 40 mg by mouth daily  Patient not taking: Reported on 3/22/2023    Historical Provider, MD     I have reviewed home medications using recent Epic encounter  Allergies: No Known Allergies    Social History:  Marital Status: /Civil Union   Patient Pre-hospital Living Situation: Home  Patient Pre-hospital Level of Mobility: walks with walker  Patient Pre-hospital Diet Restrictions: None  Substance Use History:   Social History     Substance and Sexual Activity   Alcohol Use None     Social History     Tobacco Use   Smoking Status Never   Smokeless Tobacco Never     Social History     Substance and Sexual Activity   Drug Use Not on file       Family History:  History reviewed  No pertinent family history      Physical Exam:     Vitals:   Blood Pressure: 146/85 (03/22/23 2144)  Pulse: 95 (03/22/23 2144)  Temperature: 98 1 °F (36 7 °C) (03/22/23 2144)  Temp Source: Oral (03/22/23 2144)  Respirations: 18 (03/22/23 2144)  Weight - Scale: 95 8 kg (211 lb 3 2 oz) (03/22/23 1514)  SpO2: 98 % (03/22/23 2144)    Physical Exam  Vitals and nursing note reviewed  Constitutional:       General: She is not in acute distress  Appearance: She is well-developed  She is obese  HENT:      Head: Normocephalic and atraumatic  Nose: Nose normal  No congestion  Mouth/Throat:      Mouth: Mucous membranes are moist       Pharynx: Oropharynx is clear  Eyes:      Conjunctiva/sclera: Conjunctivae normal    Cardiovascular:      Rate and Rhythm: Normal rate and regular rhythm  Heart sounds: Normal heart sounds  No murmur heard  No friction rub  No gallop  Pulmonary:      Effort: Pulmonary effort is normal  No respiratory distress  Breath sounds: Normal breath sounds  No wheezing, rhonchi or rales  Abdominal:      General: Bowel sounds are normal  There is no distension  Palpations: Abdomen is soft  Tenderness: There is no abdominal tenderness  Musculoskeletal:         General: Tenderness (lumbar spine ) present  Cervical back: Neck supple  Right lower leg: No edema  Left lower leg: No edema  Skin:     General: Skin is warm and dry  Capillary Refill: Capillary refill takes less than 2 seconds  Neurological:      General: No focal deficit present  Mental Status: She is alert and oriented to person, place, and time  Mental status is at baseline  Cranial Nerves: No cranial nerve deficit  Sensory: No sensory deficit  Motor: No weakness (Bilateral lower extremity strength 5/5)        Coordination: Coordination normal    Psychiatric:         Mood and Affect: Mood normal          Behavior: Behavior normal           Additional Data:     Lab Results:  Results from last 7 days   Lab Units 03/22/23  1602   WBC Thousand/uL 10 57*   HEMOGLOBIN g/dL 13 0   HEMATOCRIT % 39 7   PLATELETS Thousands/uL 163   NEUTROS PCT % 75   LYMPHS PCT % 15   MONOS PCT % 8   EOS PCT % 1     Results from last 7 days   Lab Units 03/22/23  1602   SODIUM mmol/L 135   POTASSIUM mmol/L 5 0   CHLORIDE mmol/L 99   CO2 mmol/L 29   BUN mg/dL 23   CREATININE mg/dL 0 96   ANION GAP mmol/L 7   CALCIUM mg/dL 10 1   GLUCOSE RANDOM mg/dL 112                       Lines/Drains:  Invasive Devices     Peripheral Intravenous Line  Duration           Peripheral IV 03/22/23 Right Antecubital <1 day                    Imaging: Reviewed radiology reports from this admission including: MRI spine  No orders to display       EKG and Other Studies Reviewed on Admission:   · EKG: No EKG obtained  ** Please Note: This note has been constructed using a voice recognition system   **

## 2023-03-23 NOTE — ASSESSMENT & PLAN NOTE
"· Patient with PMHx of intractable lower back pain followed outpatient by [facility] presents to the ED today with persistent low back pain radiating into bilateral sides, L>R initially 10/10, now 5/10  · Denies urinary/bowel incontinence, saddle paresthesias, leg numbness or weakness  · Patient recently admitted to [facility] for the same issue 3/16-3/19   · Patient reports recently seeing [facility] earlier in the week, who switched her medication regimen  · Weaning off gabapentin now taking 300mg once daily x 2 more days, initiated 300mg etodolac q8 hours, but patient reports only taking one dose of this medication so far, valium increased to 5mg prn spasm  · Recommending spinal ablation with consultation scheduled April 21  · MRI 3/16 done by [facility] demonstrating \"Multilevel lumbar spondylosis  Spinal canal narrowing including a mild to moderate spinal canal stenosis at L2-L3  Multilevel neural foraminal narrowing including a moderate right L3-L4 neural   foraminal stenosis  Impingement of the exiting right L3 nerve root  Possible impingement of the   exiting left L4 nerve root  Small left foraminal protrusion at L5-S1 which does not clearly impinge the   exiting left L5 nerve root  Diffuse nonspecific marrow heterogeneity  Differential considerations include   hematopoetic marrow hyperplasia related to anemia, prior chemotherapy, chronic hypoxia, or erythropoietin administration, although a marrow replacement process can appear similarly  Correlation with patient's history and clinical presentation recommended  \"  · Continue pain management   · Orthopedic surgery consulted  · Patient report pain so severe walks with walker at baseline, PT/OT consulted  "

## 2023-03-23 NOTE — UTILIZATION REVIEW
Initial Clinical Review    Observation 3/22 @ 1718 and changed to Inpatient on 3/23 @ 1642  Pt requiring continued stay for  Intractable low back pain and Acute cystitis with hematuria/ Iv antibiotics  Pain control  Admission: Date/Time/Statement:   Admission Orders (From admission, onward)     Ordered        03/23/23 1642  Inpatient Admission  Once            03/22/23 1718  Place in Observation  Once                      Orders Placed This Encounter   Procedures   • Inpatient Admission     Standing Status:   Standing     Number of Occurrences:   1     Order Specific Question:   Level of Care     Answer:   Med Surg [16]     Order Specific Question:   Estimated length of stay     Answer:   More than 2 Midnights     Order Specific Question:   Certification     Answer:   I certify that inpatient services are medically necessary for this patient for a duration of greater than two midnights  See H&P and MD Progress Notes for additional information about the patient's course of treatment  ED Arrival Information     Expected   -    Arrival   3/22/2023 14:58    Acuity   Urgent            Means of arrival   Wheelchair    Escorted by   Spouse    Service   Hospitalist    Admission type   Emergency            Arrival complaint   back pain            Chief Complaint   Patient presents with   • Back Pain     Pt reports lower back pain since 2/1/23, pt reports she has been in and out of the hospital for severe lower back pain       Initial Presentation: 56 y o  female to ED presents for severe lumbar spine pain  Per pt, she has had severe lower back pain gradually worsening, but leading to repeat hospitalization x 2 months  Now the pain is so bad that she now walks with a walker at baseline  Patient was hospitalized at [facility] last week and then had follow-up with [facility] earlier this week who adjusted her medication regimen    They report that they think her pain is more arthritic in nature than nerve related and "are currently weaning her gabapentin to etodolac  They also increased her Valium from 2 mg to 5 mg for muscle spasms and increased her Oxy codon from 2 mg to 5 mg as well  Patient reports that she has only taken 1 day worth of the etodolac, but the pain was unsustainable  Seen at [facility] ED earlier today, given pain cocktail and sent home  Pain meds wore off at home and she went to Urgent care again  Pain 10/10 and worse with movement  Given Toradol and Dilaudid in ED with some improvement  PMH for HTN, Chronic, continuous use of opioids  Admit Observation level of care for Intractable low back pain and Acute cystitis with hematuria  Oral Thrush - recently started on fluconazole daily and continue  Recommending spinal ablation with consultation scheduled April 21  Orthopedic consult  Pain control  Start Iv antibiotics  MRI 3/16 at [facility] demonstrating \"Multilevel lumbar spondylosis  Spinal canal narrowing including a mild to moderate spinal canal stenosis at L2-L3  Multilevel neural foraminal narrowing including a moderate right L3-L4 neural   foraminal stenosis  Impingement of the exiting right L3 nerve root  Possible impingement of the exiting left L4 nerve root  Small left foraminal protrusion at L5-S1 which does not clearly impinge the exiting left L5 nerve root  Diffuse nonspecific marrow heterogeneity  Differential considerations include hematopoetic marrow hyperplasia related to anemia, prior chemotherapy, chronic hypoxia, or erythropoietin administration, although a marrow replacement process can appear similarly  Correlation with patient's history and clinical presentation recommended  \"    3/23 Changed to Inpatient status  Orthopedic cons; Acute on chronic lumbar back pain  If pt continues with uncontrolled pain tomorrow, order lumbar CT scan  Otherwise can f/u outpt  Progress notes; Treated with steroid injection and that lasted for about 2 years and 6 months  Pt with severe pain   May not be able to " fix this  Anything we could do right now would be a bridge towards getting more definitive care  Pt has a video consultation with the [facility] spine [place] tomorrow which she can do at the bedside  Will see if we can get her pain better in the meantime  Date: 3/24  Day 2:  Per Orthopedic; Pain more intense today than yesterday per pt  It is all located central and right lower back  Movement worsens symptom  No bowel or bladder incontinence, no saddle anesthesia  CT Lumbar spine ordered  She has a phone conference at [facility]  Recommend muscle relaxant and course of oral steroids if able medically  Progress notes; Pt has bad morning, and states it was actually worse than when she initially came in  Started on prednisone  Continue Iv antibiotics  Pt unable to walk though   States pain severe with standing up          ED Triage Vitals   Temperature Pulse Respirations Blood Pressure SpO2   03/22/23 1514 03/22/23 1514 03/22/23 1514 03/22/23 1514 03/22/23 1514   98 3 °F (36 8 °C) (!) 120 18 (!) 148/105 95 %      Temp Source Heart Rate Source Patient Position - Orthostatic VS BP Location FiO2 (%)   03/22/23 1514 03/22/23 1514 03/22/23 1910 03/22/23 1653 --   Oral Monitor Sitting Left arm       Pain Score       03/22/23 1514       10 - Worst Possible Pain          Wt Readings from Last 1 Encounters:   03/22/23 95 8 kg (211 lb 3 2 oz)     Additional Vital Signs:   03/24/23 15:31:07 97 5 °F (36 4 °C) 78 18 116/80 92 92 % -- --   03/24/23 07:47:08 97 6 °F (36 4 °C) 86 20 115/81 92 96 % -- --   03/23/23 22:18:22 98 9 °F (37 2 °C) 93 22 117/82 94 91 % None (Room air) --   03/23/23 15:07:27 99 3 °F (37 4 °C) 89 16 117/82 94 88 %   Abnormal  -- --   03/23/23 07:42:44 98 5 °F (36 9 °C) 87 17 119/81 94 95 % -- --     03/23/23 07:42:44 98 5 °F (36 9 °C) 87 17 119/81 94 95 % -- --   03/22/23 21:44:44 98 1 °F (36 7 °C) 95 18 146/85 105 98 % None (Room air) Lying   03/22/23 1910 -- 85 16 113/89 -- 96 % None (Room air) Sitting   03/22/23 1653 -- 102 18 116/67 -- 97 % None (Room air)      Pertinent Labs/Diagnostic Test Results:   CT spine lumbar wo contrast   Final Result by Heather Campbell MD (03/24 1145)      1  Mild levoscoliosis, apex at L3       2  Multilevel degenerative disc disease with mild central canal narrowing at the L2-3 and L4-5 levels  3  Tiny nonobstructive right renal calculus           Workstation performed: [id number]           Results from last 7 days   Lab Units 03/22/23  1831   SARS-COV-2  Negative     Results from last 7 days   Lab Units 03/23/23  0522 03/22/23  1602   WBC Thousand/uL 9 39 10 57*   HEMOGLOBIN g/dL 12 5 13 0   HEMATOCRIT % 38 4 39 7   PLATELETS Thousands/uL 191 163   NEUTROS ABS Thousands/µL 6 54 8 01*         Results from last 7 days   Lab Units 03/23/23  0522 03/22/23  1602   SODIUM mmol/L 135 135   POTASSIUM mmol/L 4 4 5 0   CHLORIDE mmol/L 97 99   CO2 mmol/L 30 29   ANION GAP mmol/L 8 7   BUN mg/dL 25 23   CREATININE mg/dL 0 86 0 96   EGFR ml/min/1 73sq m 76 66   CALCIUM mg/dL 10 2 10 1             Results from last 7 days   Lab Units 03/23/23  0522 03/22/23  1602   GLUCOSE RANDOM mg/dL 98 112       Results from last 7 days   Lab Units 03/22/23  1722   CLARITY UA  Clear   COLOR UA  Yellow   SPEC GRAV UA  >=1 030   PH UA  6 0   GLUCOSE UA mg/dl Negative   KETONES UA mg/dl Negative   BLOOD UA  Moderate*   PROTEIN UA mg/dl 30 (1+)*   NITRITE UA  Positive*   BILIRUBIN UA  Negative   UROBILINOGEN UA E U /dl 0 2   LEUKOCYTES UA  Trace*   WBC UA /hpf 30-50*   RBC UA /hpf 30-50*   BACTERIA UA /hpf Innumerable*   EPITHELIAL CELLS WET PREP /hpf Moderate*   MUCUS THREADS  Occasional*     Results from last 7 days   Lab Units 03/22/23  1831   INFLUENZA A PCR  Negative   INFLUENZA B PCR  Negative   RSV PCR  Negative       ED Treatment:   Medication Administration from 03/22/2023 1458 to 03/22/2023 2136       Date/Time Order Dose Route Action     03/22/2023 1605 EDT HYDROmorphone (DILAUDID) injection 1 mg 1 mg Intravenous Given     03/22/2023 1604 EDT ketorolac (TORADOL) injection 30 mg 30 mg Intravenous Given     03/22/2023 1743 EDT HYDROmorphone (DILAUDID) injection 1 mg 1 mg Intravenous Given        History reviewed  No pertinent past medical history  Present on Admission:  • Hypertension      Admitting Diagnosis: Low back pain [M54 50]  Back pain [M54 9]  Intractable back pain [M54 9]  Age/Sex: 56 y o  female     Admission Orders:  Scheduled Medications:  baclofen, 10 mg, Oral, TID  cefTRIAXone, 2,000 mg, Intravenous, Q24H  docusate sodium, 100 mg, Oral, BID  enoxaparin, 40 mg, Subcutaneous, Daily  fluconazole, 100 mg, Oral, Daily  gabapentin, 300 mg, Oral, Daily  hydroxychloroquine, 200 mg, Oral, BID  lisinopril, 10 mg, Oral, Daily  predniSONE, 60 mg, Oral, Daily      Continuous IV Infusions: None     PRN Meds:  acetaminophen, 650 mg, Oral, Q6H PRN  aluminum-magnesium hydroxide-simethicone, 30 mL, Oral, Q6H PRN  diazepam, 5 mg, Oral, Q8H PRN 3/22 x1, 3/23 x4, 3/24 x3  HYDROmorphone, 0 5 mg, Intravenous, Q2H PRN 3/23 x4, 3/24 x4  ketorolac, 30 mg, Intravenous, Q6H PRN 3/22 x1, 3/23 x2, 3/24 x3  ondansetron, 4 mg, Intravenous, Q6H PRN  oxyCODONE, 5 mg, Oral, Q4H PRN 3/23 x4, 3/24 x3        IP CONSULT TO ORTHOPEDIC SURGERY    Network Utilization Review Department  ATTENTION: Please call with any questions or concerns to [phone] and carefully listen to the prompts so that you are directed to the right person  All voicemails are confidential   Shu Lemons all requests for admission clinical reviews, approved or denied determinations and any other requests to dedicated fax number below belonging to the campus where the patient is receiving treatment   List of dedicated fax numbers for the Facilities:  [address] [place] [address] DENIALS (Administrative/Medical Necessity) [phone]   [address] (Maternity/NICU/Pediatrics) [phone]   [address] [place] [address] [address] [place] [phone]   [facility] [address] [phone]   [address] [place] [address] [facility] [address] [place] [address] 310 [facility] 134 545 [facility] [phone]

## 2023-03-23 NOTE — PLAN OF CARE
Problem: Potential for Falls  Goal: Patient will remain free of falls  Description: INTERVENTIONS:  - Educate patient/family on patient safety including physical limitations  - Instruct patient to call for assistance with activity   - Consult OT/PT to assist with strengthening/mobility   - Keep Call bell within reach  - Keep bed low and locked with side rails adjusted as appropriate  - Keep care items and personal belongings within reach  - Initiate and maintain comfort rounds  - Make Fall Risk Sign visible to staff  - Offer Toileting every  Hours, in advance of need  - Initiate/Maintain alarm  - Obtain necessary fall risk management equipment:   - Apply yellow socks and bracelet for high fall risk patients  - Consider moving patient to room near nurses station  Outcome: Progressing     Problem: PAIN - ADULT  Goal: Verbalizes/displays adequate comfort level or baseline comfort level  Description: Interventions:  - Encourage patient to monitor pain and request assistance  - Assess pain using appropriate pain scale  - Administer analgesics based on type and severity of pain and evaluate response  - Implement non-pharmacological measures as appropriate and evaluate response  - Consider cultural and social influences on pain and pain management  - Notify physician/advanced practitioner if interventions unsuccessful or patient reports new pain  Outcome: Progressing     Problem: INFECTION - ADULT  Goal: Absence or prevention of progression during hospitalization  Description: INTERVENTIONS:  - Assess and monitor for signs and symptoms of infection  - Monitor lab/diagnostic results  - Monitor all insertion sites, i e  indwelling lines, tubes, and drains  - Monitor endotracheal if appropriate and nasal secretions for changes in amount and color  - [place] appropriate cooling/warming therapies per order  - Administer medications as ordered  - Instruct and encourage patient and family to use good hand hygiene technique  - Identify and instruct in appropriate isolation precautions for identified infection/condition  Outcome: Progressing     Problem: SAFETY ADULT  Goal: Maintain or return to baseline ADL function  Description: INTERVENTIONS:  -  Assess patient's ability to carry out ADLs; assess patient's baseline for ADL function and identify physical deficits which impact ability to perform ADLs (bathing, care of mouth/teeth, toileting, grooming, dressing, etc )  - Assess/evaluate cause of self-care deficits   - Assess range of motion  - Assess patient's mobility; develop plan if impaired  - Assess patient's need for assistive devices and provide as appropriate  - Encourage maximum independence but intervene and supervise when necessary  - Involve family in performance of ADLs  - Assess for home care needs following discharge   - Consider OT consult to assist with ADL evaluation and planning for discharge  - Provide patient education as appropriate  Outcome: Progressing     Problem: GENITOURINARY - ADULT  Goal: Maintains or returns to baseline urinary function  Description: INTERVENTIONS:  - Assess urinary function  - Encourage oral fluids to ensure adequate hydration if ordered  - Administer IV fluids as ordered to ensure adequate hydration  - Administer ordered medications as needed  - Offer frequent toileting  - Follow urinary retention protocol if ordered  Outcome: Progressing     Problem: SKIN/TISSUE INTEGRITY - ADULT  Goal: Skin Integrity remains intact(Skin Breakdown Prevention)  Description: Assess:  -Perform Dejuan assessment every   -Clean and moisturize skin every   -Inspect skin when repositioning, toileting, and assisting with ADLS  -Assess under medical devices such as  every   -Assess extremities for adequate circulation and sensation     Bed Management:  -Have minimal linens on bed & keep smooth, unwrinkled  -Change linens as needed when moist or perspiring  -Avoid sitting or lying in one position for more than  hours while in bed  -Keep HOB at degrees     Toileting:  -Offer bedside commode  -Assess for incontinence every   -Use incontinent care products after each incontinent episode such as     Activity:  -Mobilize patient  times a day  -Encourage activity and walks on unit  -Encourage or provide ROM exercises   -Turn and reposition patient every  Hours  -Use appropriate equipment to lift or move patient in bed  -Instruct/ Assist with weight shifting every  when out of bed in chair  -Consider limitation of chair time  hour intervals    Skin Care:  -Avoid use of baby powder, tape, friction and shearing, hot water or constrictive clothing  -Relieve pressure over bony prominences using   -Do not massage red bony areas    Next Steps:  -Teach patient strategies to minimize risks such as    -Consider consults to  interdisciplinary teams such  Outcome: Progressing     Problem: MUSCULOSKELETAL - ADULT  Goal: Maintain or return mobility to safest level of function  Description: INTERVENTIONS:  - Assess patient's ability to carry out ADLs; assess patient's baseline for ADL function and identify physical deficits which impact ability to perform ADLs (bathing, care of mouth/teeth, toileting, grooming, dressing, etc )  - Assess/evaluate cause of self-care deficits   - Assess range of motion  - Assess patient's mobility  - Assess patient's need for assistive devices and provide as appropriate  - Encourage maximum independence but intervene and supervise when necessary  - Involve family in performance of ADLs  - Assess for home care needs following discharge   - Consider OT consult to assist with ADL evaluation and planning for discharge  - Provide patient education as appropriate  Outcome: Progressing  Goal: Maintain proper alignment of affected body part  Description: INTERVENTIONS:  - Support, maintain and protect limb and body alignment  - Provide patient/ family with appropriate education  Outcome: Progressing     Problem: MOBILITY - ADULT  Goal: Maintain or return to baseline ADL function  Description: INTERVENTIONS:  -  Assess patient's ability to carry out ADLs; assess patient's baseline for ADL function and identify physical deficits which impact ability to perform ADLs (bathing, care of mouth/teeth, toileting, grooming, dressing, etc )  - Assess/evaluate cause of self-care deficits   - Assess range of motion  - Assess patient's mobility; develop plan if impaired  - Assess patient's need for assistive devices and provide as appropriate  - Encourage maximum independence but intervene and supervise when necessary  - Involve family in performance of ADLs  - Assess for home care needs following discharge   - Consider OT consult to assist with ADL evaluation and planning for discharge  - Provide patient education as appropriate  Outcome: Progressing  Goal: Maintains/Returns to pre admission functional level  Description: INTERVENTIONS:  - Perform BMAT or MOVE assessment daily    - Set and communicate daily mobility goal to care team and patient/family/caregiver  - Collaborate with rehabilitation services on mobility goals if consulted  - Perform Range of Motion  times a day  - Reposition patient every  hours    - Dangle patient  times a day  - Stand patient  times a day  - Ambulate patient  times a day  - Out of bed to chair  times a day   - Out of bed for meals  times a day  - Out of bed for toileting  - Record patient progress and toleration of activity level   Outcome: Progressing

## 2023-03-23 NOTE — CONSULTS
Orthopedics   Michael Candelario 56 y o  female MRN: [id number]  Unit/Bed#: [address] 212-02      Chief Complaint:   Back Pain    HPI:   56 y  o female community ambulator complaining of Lumbar back pain  She has a significant history of intractable lower back pain which has been ongoing for 8-10 years without any known inciting injury  Her back pain flares frequently and she will require pain medicine and prednisone to relieve the pain  Her most recent lumbar pain flare-up began on 2/1 without any known injury  She says she has been hospitalized maybe 7-8 times since that date because the pain is so bad it prevents her from walking  In the past week she has started using a walker because of the pain  She sleeps in her living room in a hospital bed with adjustable head and feet height  Her last prior hospitalization was at [facility] 3/16-3/19 for the same issue  She recently started following as an outpatient at the [facility]  They have changed her pain regimen from gabapentin to etodolac with Valium PRN muscle spasms  They are recommending spinal ablation with virtual consultation scheduled tomorrow  She has had kidney stones in the past but says this feels very different from her usual kidney stone pain  Usually that pain eventually radiates into the groin, but these just seems to radiates into her back muscles  She did have a CT scan at [facility] which revealed bilateral nonobstructive renal calculi  She is currently being treated for an incidentally found non-symptomatic UTI  She is receiving antibiotics  She denies urinary/bowel incontinence, saddle paresthesias, or leg numbness or weakness  She has had constipation for maybe 7 days, but she thinks this is from all the pain medication and the fact that she has not had an appetite because of the pain  She denies fevers or chills  She denies respiratory symptoms  She says she is desperate for pain relief    She says she cannot walk and can barely sleep   She is hoping we can do something to help her  Review Of Systems:   · Skin: Normal  · Neuro: See HPI  · Musculoskeletal: See HPI  · 14 point review of systems negative except as stated above     Past Medical History:   History reviewed  No pertinent past medical history  Past Surgical History:   History reviewed  No pertinent surgical history  Family History:  Family history reviewed and non-contributory  History reviewed  No pertinent family history      Social History:  Social History     Socioeconomic History   • Marital status: /Civil Union     Spouse name: None   • Number of children: None   • Years of education: None   • Highest education level: None   Occupational History   • None   Tobacco Use   • Smoking status: Never   • Smokeless tobacco: Never   Substance and Sexual Activity   • Alcohol use: None   • Drug use: None   • Sexual activity: None   Other Topics Concern   • None   Social History Narrative   • None     Social Determinants of Health     Financial Resource Strain: Not on file   Food Insecurity: Not on file   Transportation Needs: Not on file   Physical Activity: Not on file   Stress: Not on file   Social Connections: Not on file   Intimate Partner Violence: Not on file   Housing Stability: Not on file       Allergies:   No Known Allergies        Labs:  0   Lab Value Date/Time    HCT 38 4 03/23/2023 0522    HCT 39 7 03/22/2023 1602    HGB 12 5 03/23/2023 0522    HGB 13 0 03/22/2023 1602    WBC 9 39 03/23/2023 0522    WBC 10 57 (H) 03/22/2023 1602       Meds:    Current Facility-Administered Medications:   •  acetaminophen (TYLENOL) tablet 650 mg, 650 mg, Oral, Q6H PRN, Mayme Horse, PA-C  •  aluminum-magnesium hydroxide-simethicone (MYLANTA) oral suspension 30 mL, 30 mL, Oral, Q6H PRN, Mayme Horse, PA-C  •  baclofen tablet 10 mg, 10 mg, Oral, TID, Mayme Horse, PA-C, 10 mg at 03/23/23 6406  •  cefTRIAXone (ROCEPHIN) IVPB (premix in dextrose) 2,000 mg 50 mL, 2,000 mg, Intravenous, Q24H, Jarbidge Prime, PA-C, Last Rate: 100 mL/hr at 03/22/23 2301, 2,000 mg at 03/22/23 2301  •  diazepam (VALIUM) tablet 5 mg, 5 mg, Oral, Q8H PRN, Jered Prime, PA-C, 5 mg at 03/23/23 3006  •  docusate sodium (COLACE) capsule 100 mg, 100 mg, Oral, BID, Jered Prime, PA-C, 100 mg at 03/23/23 0740  •  enoxaparin (LOVENOX) subcutaneous injection 40 mg, 40 mg, Subcutaneous, Daily, Jered Prime, PA-C, 40 mg at 03/23/23 0800  •  fluconazole (DIFLUCAN) tablet 100 mg, 100 mg, Oral, Daily, Jarbidge Prime, PA-C, 100 mg at 03/23/23 0800  •  gabapentin (NEURONTIN) capsule 300 mg, 300 mg, Oral, Daily, Jarbidge Prime, PA-C, 300 mg at 03/23/23 0800  •  HYDROmorphone (DILAUDID) injection 0 5 mg, 0 5 mg, Intravenous, Q2H PRN, Jarbidge Prime, PA-C, 0 5 mg at 03/23/23 1253  •  hydroxychloroquine (PLAQUENIL) tablet 200 mg, 200 mg, Oral, BID, Jered Prime, PA-C, 200 mg at 03/23/23 9451  •  ketorolac (TORADOL) injection 30 mg, 30 mg, Intravenous, Q6H PRN, Jarbidge Prime, PA-C, 30 mg at 03/23/23 1420  •  lisinopril (ZESTRIL) tablet 10 mg, 10 mg, Oral, Daily, Jarbidge Prime, PA-C, 10 mg at 03/23/23 0800  •  ondansetron (ZOFRAN) injection 4 mg, 4 mg, Intravenous, Q6H PRN, Jered Prime, PA-C  •  oxyCODONE (ROXICODONE) IR tablet 5 mg, 5 mg, Oral, Q4H PRN, Jered Prime, PA-C, 5 mg at 03/23/23 1021    Blood Culture:   No results found for: BLOODCX    Wound Culture:   No results found for: WOUNDCULT    Ins and Outs:  I/O last 24 hours:   In: -   Out: 20 [Urine:20]          Physical Exam:   /81   Pulse 87   Temp 98 5 °F (36 9 °C)   Resp 17   Wt 95 8 kg (211 lb 3 2 oz)   SpO2 95%   Gen: No acute distress, resting comfortably in bed  HEENT: Eyes clear, moist mucus membranes, hearing intact  Respiratory: No audible wheezing or stridor  Cardiovascular: Well Perfused peripherally, 2+ distal pulse  Abdomen: nondistended, no peritoneal signs  Musculoskeletal: BACK  · Skin intact, no open "lesions  She does have a tattoo at the distal aspect of her spine but says this is not new  · Tenderness to palpation over Lumbar spine  · She has good hip flexion/extension, knee flexion/extension, ankle dorsi/plantar flexion, EHL/FHL bilateral lower extremities  · Sensation intact L2-S1 bilateral lower extremities  · She is able to perform a straight leg raise  · Leg Lengths equal    Radiology:   I personally reviewed reports  MRI Lumbar spine 3/16/2023 with [facility] reveals:  \"Multilevel lumbar spondylosis; Spinal canal narrowing including a mild to moderate spinal canal stenosis at L2-L3; multilevel neural foraminal narrowing including a moderate right L3-L4 neural foraminal stenosis; impingement of the exiting right L3 nerve root; possible impingement of the exiting left L4 nerve root; Small left foraminal protrusion at L5-S1 which does not clearly impinge the exiting left L5 nerve root; Diffuse nonspecific marrow heterogeneity  Differential considerations include hematopoetic marrow hyperplasia related to anemia, prior chemotherapy, chronic hypoxia, or erythropoietin administration, although a marrow replacement process can appear similarly  Correlation with patient's history and clinical presentation recommended  \"      Assessment:  56 y  o female complaining of acute on chronic Lumbar back pain    Plan:   · The case was reviewed with Dr Carl Wilkerson  He recommends that if the patient is still having uncontrolled pain tomorrow, we order a lumbar CT scan  Otherwise, we recommend outpatient follow-up  · PT/OT  · Please place a STAT neurosurgery consult for the development of any new or concerning red flags symptoms including sudden loss of bladder or bowel function or saddle anesthesia  · Continue with colace for constipation  Would recommend enema or suppository if she continues to be unable to to have a bowel movement    · Ambulatory referrals to both Dr Carl Wilkerson and [facility] have been placed in the " patient's chart should she wish to pursue a second opinion  Otherwise, continue to follow-up with the [facility] for potential spinal ablation later this year  · Dispo: Ortho will follow for one more day to evaluate need for CT scan         Jake Vee PA-C

## 2023-03-23 NOTE — ASSESSMENT & PLAN NOTE
Started about 3 years ago  Treated with steroid injection and that lasted for about 2 years and 6 months  Now with severe back pain  She has been seen several times at [facility]   She is also beginning consultation down at a spinal [place] in [place]    But is having severe pain  Nothing seems to help at this point  I did explain that we may not be able to fix this  Anything we could do right now would be a bridge towards getting more definitive care  She has a video consultation with the [place] [facility] tomorrow which she can do at the bedside  We will see if we can get her pain better in the meantime  And see what their plan is    Orthopedics did see her here as well and also mention we could get a second opinion by spinal surgery at [facility]

## 2023-03-23 NOTE — ASSESSMENT & PLAN NOTE
· Patient reports she recently started on fluconazole once daily for issue   · She states she has been on two long tapers of steroids for her back pain, likely the cause   · Continue fluconazole

## 2023-03-23 NOTE — PHYSICAL THERAPY NOTE
PT EVALUATION    Pt  Name: Nessa Swan  Pt  Age: 56 y o  MRN: [id number]  LENGTH OF STAY: 0      Admitting Diagnoses:   Low back pain [M54 50]  Back pain [M54 9]  Intractable back pain [M54 9]    History reviewed  No pertinent past medical history  History reviewed  No pertinent surgical history  Imaging Studies:  No orders to display          03/23/23 0982   PT Last Visit   PT Visit Date 03/23/23   Note Type   Note type Evaluation   Additional Comments Pt supine in bed pre session   Pain Assessment   Pain Assessment Tool 0-10   Pain Score 5  (increased to 7/10 w/ mobilization)   Pain Location/Orientation Location: Back   Hospital Pain Intervention(s) Repositioned; Ambulation/increased activity; Emotional support; Rest   Restrictions/Precautions   Weight Bearing Precautions Per Order No   Other Precautions Fall Risk;Pain   Home Living   Type of [address] One level;Performs ADLs on one level; Able to live on main level with bedroom/bathroom;Stairs to enter with rails   Bathroom Shower/Tub Walk-in shower   Bathroom Toilet Raised   Bathroom Equipment Grab bars in shower; Shower chair   [address] Walker   Additional Comments raised bed; 1+1 IHSAN w/ rail   Prior Function   Level of [place] Independent with functional mobility; Needs assistance with ADLs  (prio to beginning of LBP flare up in early Feb, pt was I w/ amb and functional mobility and I w/ ADLs)   Lives With Spouse; Other (Comment)  (mother-in-law)   Receives Help From [facility] in the last 6 months 0   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   Comments prior to episode of pain beginning early Feb '23, pt (I) amb w/ no AD, since pain, pt (I) amb w/ RW  (+)    General   Additional Pertinent History Current episode of LBP began early Feb '23   HTN, use of opioids, hx B/L knee replacement   Family/Caregiver Present No   Cognition   Overall Cognitive Status [facility]   Arousal/Participation Alert Attention Within functional limits   Orientation Level Oriented X4   Following Commands Follows all commands and directions without difficulty   Comments pleasant and cooperative   Subjective   Subjective pt agreeable to therapy   RUE Assessment   RUE Assessment   (see OT eval)   LUE Assessment   LUE Assessment   (see OT eval)   RLE Assessment   RLE Assessment WFL  (grossly 4+/5)   LLE Assessment   LLE Assessment WFL  (grossly 4+/5)   Coordination   Movements are Fluid and Coordinated 1   Sensation WFL   Bed Mobility   Supine to Sit 6  Modified independent   Additional items Bedrails;HOB elevated; Increased time required   Sit to Supine 5  Supervision   Additional items HOB elevated; Bedrails; Increased time required   Additional Comments no v/c required   Transfers   Sit to Stand 5  Supervision   Additional items Increased time required;Verbal cues  (to RW)   Stand to Sit 5  Supervision   Additional items Increased time required;Verbal cues  (from RW)   Toilet transfer 5  Supervision   Additional items Increased time required;Verbal cues;Armrests   Additional Comments V/c for hand placement and safety techniques   Ambulation/Elevation   Gait pattern Antalgic;Decreased foot clearance; Excessively slow;Decreased hip extension;Decreased heel strike;Decreased toe off;Step through pattern   Gait Assistance 5  Supervision   Additional items Verbal cues   Assistive Device Rolling walker   Distance 20' x2   Ambulation/Elevation Additional Comments During ambulation, no gross LOB and no complaints of dizziness, lightheadedness or SOB     Balance   Static Sitting Good   Dynamic Sitting Fair +   Static Standing Fair   Dynamic Standing Fair -   Ambulatory Fair -   Endurance Deficit   Endurance Deficit Yes   Endurance Deficit Description pt reported increased pain w/ mobility and ambulation   Activity Tolerance   Activity Tolerance Patient limited by pain   Medical Staff Made Aware FAUSTO Hope   Nurse Made Aware FORREST Moscoso Assessment   Prognosis Fair   Problem List Decreased strength;Decreased endurance; Impaired balance;Decreased mobility;Pain   Assessment Pt 56 y o  female w/ known opioid use and HTN presented to [facility] on 3/22/2023 w/ severe LBP  Episode of LBP began early Feb '23 w/ multiple hospital visits w/ increasing pain  See EMR Pt admitted for Intractable low back pain, acute cystitis, and oral thrush  PT eval and tx order w/ up w/ A placed  PTA, pt was independent w/ all functional mobility w/ RW and lives w/  and mother-in-law in 1 level home  Upon evaluation, pt exhibits weakness, impaired balance, decreased endurance and pain as noted in flow sheet  Pt demonstrated bed mobility w/ Mod I and transfers w/ Supervision requiring verbal cuing for proper mechanics and safety  Pt was able to ambulate using Rolling Walker w/ Supervision   Observable gait deviations as noted in flowsheet  During ambulation, no gross LOB and no complaints of dizziness, lightheadedness or SOB  Ambulation limited by pain which increased w/ mobility and ambulation  The above mentioned impairments limit pt's ability for independent functional mobility hence will benefit from skilled PT during hospital stay to improve function  The patient's AM-PAC Basic Mobility Inpatient Short Form Raw Score is 19   A Raw score of greater than 16 suggests the patient may benefit from discharge to home  Please also refer to the recommendation of the Physical Therapist for safe discharge planning  PT will recommend home with outpatient rehabilitation upon d/c from acute care once medically managed to improve functional mobility to baseline  Education provided to pt regarding importance of PT  Continue to encourage mobilization w/ nursing including OOB for meals as tolerated to prevent further functional decline  Nursing notified  Pt tolerated session well   Pt at end of session, in stable condition, supine in bed with all needs within reach  Co-eval with OT necessary for pt's best interest and medical complexity  Barriers to Discharge None   Goals   Patient Goals decreased pain and to be functional again   STG Expiration Date 04/02/23   Short Term Goal #1 Within 10 days, to progress towards baseline, improve safety, and decrease caregiver burden, pt to: 1  Improve strength by at least 1 grade in all ROM   2  Improve balance by at least 1 grade  3  Improve bed mobility to independent  4  Improve assistance level to Mod I w/ transfers  5  Increase ambulating distance to >300ft with appropriate AD w/ mod I  6  Demonstrate stair navigation w/ safe strategy and appropriate AD w/ supervision  7  Pt/ family education  PT Treatment Day 0   Plan   Treatment/Interventions Functional transfer training;LE strengthening/ROM; Therapeutic exercise; Endurance training;Patient/family training;Bed mobility;Gait training;Spoke to nursing;OT;Elevations   PT Frequency 3-5x/wk   Recommendation   PT Discharge Recommendation Home with outpatient rehabilitation   AM-PAC Basic Mobility Inpatient   Turning in Flat Bed Without Bedrails 4   Lying on Back to Sitting on Edge of Flat Bed Without Bedrails 4   Moving Bed to Chair 3   Standing Up From Chair Using Arms 3   Walk in Room 3   Climb 3-5 Stairs With Railing 2   Basic Mobility Inpatient Raw Score 19   Basic Mobility Standardized Score 42 48   Highest Level Of Mobility   JH-HLM Goal 6: Walk 10 steps or more   JH-HLM Achieved 6: Walk 10 steps or more   End of Consult   Patient Position at End of Consult Supine; All needs within reach   End of Consult Comments pt in stable condition, supine in bed as requested, w/ all needs within reach     Hx/personal factors: co-morbidities, inaccessible home, use of AD, pain, fall risk, and obesity  Examination: dec mobility, dec balance, dec endurance, dec amb, risk for falls, pain  Clinical: unpredictable (ongoing medical status, consult pending, and pain mgt)  Complexity: high    Smita Sen PTS

## 2023-03-23 NOTE — OCCUPATIONAL THERAPY NOTE
Occupational Therapy Evaluation     Patient Name: Justine Choudhury  [id number]'O Date: 3/23/2023  Problem List  Principal Problem:    Intractable low back pain  Active Problems:    Hypertension    Acute cystitis with hematuria    Chronic, continuous use of opioids    Thrush, oral    Past Medical History  History reviewed  No pertinent past medical history  Past Surgical History  History reviewed  No pertinent surgical history  03/23/23 0956   OT Last Visit   OT Visit Date 03/23/23   Note Type   Note type Evaluation   Additional Comments Pt greeted in supine and agreeable skilled OT evaluation  Pain Assessment   Pain Assessment Tool 0-10   Pain Score 7  (reports 5/10 pain at rest prior to session, 7/10 with activity )   Pain Location/Orientation Location: Back   Restrictions/Precautions   Weight Bearing Precautions Per Order No   Other Precautions Fall Risk;Pain   Home Living   Type of [address] [place] [address] One level;Performs ADLs on one level;Stairs to enter with rails   Bathroom Shower/Tub Walk-in shower   Bathroom Toilet Raised   Bathroom Equipment Grab bars in shower; Shower chair   Home Equipment Walker   Additional Comments 2 IHSAN  adjustable bed   Prior Function   Level of [place] Independent with functional mobility; Independent with ADLs; Needs assistance with IADLS  (up until Feb 2609, pt was I with ADLs and mobility no device  since feb has required A from  due to pain )   Lives With Spouse  (Mother in [place])   [place] [address] Help From Family   IADLs Independent with driving; Independent with medication management; Family/Friend/Other provides transportation   Falls in the last 6 months 0   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   Comments Prior to admission, pt lives with  and mother in law in a ranch style home with 2 IHSAN with rails  Home has a walk in shower with seat and grab bars, raised toilets  Since Feb when back pain got worse, pt Has been ambulating with RW,  A with ADLS and IADLs   has been sleeping in adjustable bed  Drives  [employer]  reports 0 falls in the past 6 months  Lifestyle   Reciprocal Relationships  and Mother in law   ADL   Eating Assistance 7  Independent   Grooming Assistance 7  [facility] 5  Supervision/Setup   LB Bathing Assistance 4  Minimal [facility] 200 5  Supervision/Setup    [facility] 4  Minimal [address]  5  Supervision/Setup   Bed Mobility   Supine to Sit 6  Modified independent   Additional items HOB elevated; Bedrails; Increased time required   Sit to Supine 6  Modified independent   Additional items HOB elevated; Bedrails; Increased time required   Transfers   Sit to Stand 5  Supervision   Additional items Increased time required;Verbal cues   Stand to Sit 5  Supervision   Additional items Increased time required;Verbal cues   Toilet transfer 5  Supervision   Additional items Increased time required;Verbal cues;Standard toilet   Functional Mobility   Functional Mobility 5  Supervision   Additional Comments functional distances in room with RW     Additional items Rolling walker   Balance   Static Sitting Good   Dynamic Sitting Fair +   Static Standing Fair   Dynamic Standing Fair -   Ambulatory Fair -   Activity Tolerance   Activity Tolerance Patient limited by pain   Medical Staff Made Aware PT Milan, PTS Elena   Nurse Made Aware RN Evangelista Zapata   RUE Assessment   RUE Assessment WFL   LUE Assessment   LUE Assessment WFL   Hand Function   Gross Motor Coordination Functional   Fine Motor Coordination Functional   Vision-Basic Assessment   Current Vision No visual deficits   Psychosocial   Psychosocial (WDL) WDL   Cognition   Overall Cognitive Status WFL   Arousal/Participation Cooperative   Attention Within functional limits   Orientation Level Oriented X4   Memory Within functional limits   Following Commands Follows all commands and directions without difficulty   Comments pleasant and "cooperative   Assessment   Limitation Decreased ADL status; Decreased UE strength;Decreased UE ROM; Decreased Safe judgement during ADL;Decreased endurance;Decreased self-care trans   Prognosis Good   Assessment Tabitha Candelario is a 56 y o  female who presents with severe lumbar spine pain  MRI 3/16 done by [facility] demonstrating \"Multilevel lumbar spondylosis  Spinal canal narrowing including a mild to moderate spinal canal stenosis at L2-L3  Multilevel neural foraminal narrowing including a moderate right L3-L4 neural foraminal stenosis  Impingement of the exiting right L3 nerve root  Possible impingement of the exiting left L4 nerve root  Small left foraminal protrusion at L5-S1\"  Pt with active orders for OT and up with assistance  Prior to admission, pt lives with  and mother in law in a ranch style home with 2 IHSAN with rails  Home has a walk in shower with seat and grab bars, raised toilets  Since Feb when back pain got worse, pt Has been ambulating with RW,  A with ADLS and IADLs  has been sleeping in adjustable bed  [employer]  reports 0 falls in the past 6 months  Upon evaluation, pt presenting below functional baseline with deficits noted in strength, activity tolerance, balance, safety awareness  which is limiting pts functional ability to complete ADLs/ IADLs, functional transfers and functional mobility  Pt current level of function: bed mobility - supervision; transfers- supervision; functional mobility-supervision with RW ; UB dressing / bathing - supervision; LB dressing/ bathing- Dany; toileting - supervision  Pt would benefit from skilled OT 2-3x/wk to maximize functional independence  OT DC recommendation: OPPT  Goals   Patient Goals less back pain     STG Time Frame 3-5   Short Term Goal #1 Pt will improve activity tolerance to G for min 30 min txment sessions for increase engagement in functional tasks   Short Term Goal #2 Pt will complete bed mobility at a Mod I level w/ G " balance/safety demonstrated to decrease caregiver assistance required   Short Term Goal  Pt will complete LB dressing/self care w/ mod I using adaptive device and DME as needed   LTG Time Frame 10-14   Long Term Goal #1 Pt will complete toileting w/ mod I w/ G hygiene/thoroughness using DME as needed   Long Term Goal #2 Pt will improve functional transfers to Mod I on/off all surfaces using DME as needed w/ G balance/safety   Long Term Goal Pt will improve functional mobility during ADL/IADL/leisure tasks to Mod I using DME as needed w/ G balance/safety   Plan   Treatment Interventions ADL retraining;Functional transfer training;UE strengthening/ROM; Endurance training;Patient/family training;Equipment evaluation/education; Neuromuscular reeducation; Compensatory technique education; Energy conservation; Activityengagement   Goal Expiration Date 04/06/23   OT Treatment Day 0   OT Frequency 2-3x/wk   Recommendation   OT Discharge Recommendation Home with outpatient rehabilitation   Additional Comments  The patient's raw score on the AM-PAC Daily Activity Inpatient Short Form is 20  A raw score of greater than or equal to 19 suggests the patient may benefit from discharge to home  Please refer to the recommendation of the Occupational Therapist for safe discharge planning     AM-PAC Daily Activity Inpatient   Lower Body Dressing 3   Bathing 3   Toileting 3   Upper Body Dressing 3   Grooming 4   Eating 4   Daily Activity Raw Score 20   Daily Activity Standardized Score (Calc for Raw Score >=11) 42 03   AM-[facility] Applied Cognition Inpatient   Following a Speech/Presentation 4   Understanding Ordinary Conversation 4   Taking Medications 4   Remembering Where Things Are Placed or Put Away 4   Remembering List of 4-5 Errands 4   Taking Care of Complicated Tasks 4   Applied Cognition Raw Score 24   Applied Cognition Standardized Score 62 21   Nahed Broussard, OT

## 2023-03-23 NOTE — ASSESSMENT & PLAN NOTE
"· Patient reports that she notices some \"pink\" when she urinated earlier, but denies other symptoms such as dysuria, urgency, frequency, urinary retention  · Patient reports has had kidney stones in the past, but back pain her normal chronic issue, not flank pain, typical kidney stone related pain for her  · Last CT abdomen/pelvis 2/9/2022 demonstrating \"There are multiple small bilateral renal calculi  Largest   calculus in the right kidney is in the lower pole measuring 3 8 mm  Small calculi seen in the left kidney  No hydronephrosis  No hydroureter or ureteral calculus  \"  · WBC 10 57, afebrile  · Will initiate ceftriaxone, continue to monitor   "

## 2023-03-23 NOTE — DISCHARGE INSTR - AVS FIRST PAGE
Discharge Instructions - Orthopedics  Mayte Akers 56 y o  female MRN: [id number]  Unit/Bed#: Vanesa Stallings -02    Weight Bearing Status: You may bear weight as tolerated on both legs  Pain:  Continue analgesics as directed    Appt Instructions:   Please continue to follow-up with your doctors at the [facility]  If you desire a second opinion, please call the clinic at [phone]  Please make follow-up appointment with Dr Dayday Haynes  Miscellaneous:  None      Dear Mayte Akers,     It was our pleasure to care for you here at [facility]  It is our hope that we were always able to exceed the expected standards for your care during your stay  You were hospitalized due to back pain  You were cared for on the 2nd floor under the service of Adria Ortega DO with the [facility] who covers for your primary care physician (PCP), Ziyad Sheikh DO, while you were hospitalized  If you have any questions or concerns related to this hospitalization, you may contact us at [phone]  For follow up as well as medication refills, we recommend that you follow up with your primary care physician  A registered nurse will reach out to you by phone within a few days after your discharge to answer any additional questions that you may have after going home    However, at this time we provide for you here, the most important instructions / recommendations at discharge:     Notable Medication Adjustments -   You were sent prescriptions for baclofen, as needed oxycodone for severe pain, MiraLAX daily for constipation  Testing Required after Discharge -   None  Important Follow Up Information -   Follow-up with orthopedic spine specialist   You can call medical records to get your MRI with and without contrast  Please review this entire after visit summary as additional general instructions including medication list, appointments, activity, diet, any pertinent wound care, and other additional recommendations from your care team that may be provided for you        Sincerely,     Shavonne Palencia, DO

## 2023-03-23 NOTE — PROGRESS NOTES
[facility]  Progress Note  Name: Braden Ochoa  MRN: [id number]  Unit/Bed#: [address] 212-02 I Date of Admission: 3/22/2023   Date of Service: 3/23/2023 I Hospital Day: 0    Assessment/Plan   * Intractable low back pain  Assessment & Plan  Started about 3 years ago  Treated with steroid injection and that lasted for about 2 years and 6 months  Now with severe back pain  She has been seen several times at [facility]   She is also beginning consultation down at a spinal [place] in [place]    But is having severe pain  Nothing seems to help at this point  I did explain that we may not be able to fix this  Anything we could do right now would be a bridge towards getting more definitive care  She has a video consultation with the [place] [facility] tomorrow which she can do at the bedside  We will see if we can get her pain better in the meantime  And see what their plan is    Orthopedics did see her here as well and also mention we could get a second opinion by spinal surgery at [facility]  Acute cystitis with hematuria  Assessment & Plan  · Start empirically on Rocephin               Subjective:   Back pain is tolerable while she is sitting in her bed  But when she gets up to walk and stretches herself out it becomes severe and intractable  No loss of bowel or bladder control  Objective:     Vitals:   Temp (24hrs), Av 6 °F (37 °C), Min:98 1 °F (36 7 °C), Max:99 3 °F (37 4 °C)    Temp:  [98 1 °F (36 7 °C)-99 3 °F (37 4 °C)] 99 3 °F (37 4 °C)  HR:  [] 89  Resp:  [16-18] 16  BP: (113-146)/(67-89) 117/82  SpO2:  [88 %-98 %] 88 %  There is no height or weight on file to calculate BMI  Input and Output Summary (last 24 hours):        Intake/Output Summary (Last 24 hours) at 3/23/2023 1645  Last data filed at 3/22/2023 2216  Gross per 24 hour   Intake --   Output 20 ml   Net -20 ml       Physical Exam:     Physical Exam  Vitals and nursing note reviewed  HENT:      Head: Normocephalic and atraumatic  Eyes:      Pupils: Pupils are equal, round, and reactive to light  Cardiovascular:      Rate and Rhythm: Normal rate and regular rhythm  Heart sounds: No murmur heard  No friction rub  No gallop  Pulmonary:      Effort: Pulmonary effort is normal       Breath sounds: Normal breath sounds  No wheezing or rales  Abdominal:      General: Bowel sounds are normal       Palpations: Abdomen is soft  Tenderness: There is no abdominal tenderness  Musculoskeletal:      Right lower leg: No edema  Left lower leg: No edema                 Additional Data:     Labs:    Results from last 7 days   Lab Units 03/23/23  0522   WBC Thousand/uL 9 39   HEMOGLOBIN g/dL 12 5   HEMATOCRIT % 38 4   PLATELETS Thousands/uL 191   NEUTROS PCT % 69   LYMPHS PCT % 21   MONOS PCT % 8   EOS PCT % 1     Results from last 7 days   Lab Units 03/23/23  0522   POTASSIUM mmol/L 4 4   CHLORIDE mmol/L 97   CO2 mmol/L 30   BUN mg/dL 25   CREATININE mg/dL 0 86   CALCIUM mg/dL 10 2                       * I Have Reviewed All Lab Data     Recent Cultures (last 7 days):             Last 24 Hours Medication List:   Current Facility-Administered Medications   Medication Dose Route Frequency Provider Last Rate   • acetaminophen  650 mg Oral Q6H PRN [place] Prime, PA-C     • aluminum-magnesium hydroxide-simethicone  30 mL Oral Q6H PRN [place] Prime, PA-C     • baclofen  10 mg Oral TID [place] Prime, PA-C     • cefTRIAXone  2,000 mg Intravenous Q24H [place] Prime, PA-C 2,000 mg (03/22/23 2301)   • diazepam  5 mg Oral Q8H PRN Jered Prime, PA-C     • docusate sodium  100 mg Oral BID Jered Prime, PA-C     • enoxaparin  40 mg Subcutaneous Daily Jered Prime, PA-C     • fluconazole  100 mg Oral Daily [place] Prime, PA-C     • gabapentin  300 mg Oral Daily Jered Prime, PA-C     • HYDROmorphone  0 5 mg Intravenous Q2H PRN Jered Prime, DAHLIA     • hydroxychloroquine  200 mg Oral BID Zoraida Kelly PA-C     • ketorolac  30 mg Intravenous Q6H PRN Zoraida Kelly PA-C     • lisinopril  10 mg Oral Daily Zoraida Kelly PA-C     • ondansetron  4 mg Intravenous Q6H PRN Zoraida Kelly PA-C     • oxyCODONE  5 mg Oral Q4H PRN Zoraida Kelly PA-C           VTE Pharmacologic Prophylaxis:   Pharmacologic: Enoxaparin (Lovenox)      Current Length of Stay: 0 day(s)    Current Patient Status: Inpatient       Discharge Plan: We will see if we can get her pain been under better control and then possibly get her home medicine down to this [facility]  They are not a hospital, so would not be able to transfer there    Code Status: Level 1 - Full Code           Today, Patient Was Seen By: Mandi Garza DO    ** Please Note: Dictation voice to text software may have been used in the creation of this document   **

## 2023-03-23 NOTE — PLAN OF CARE
Problem: Potential for Falls  Goal: Patient will remain free of falls  Description: INTERVENTIONS:  - Educate patient/family on patient safety including physical limitations  - Instruct patient to call for assistance with activity   - Consult OT/PT to assist with strengthening/mobility   - Keep Call bell within reach  - Keep bed low and locked with side rails adjusted as appropriate  - Keep care items and personal belongings within reach  - Initiate and maintain comfort rounds  - Make Fall Risk Sign visible to staff  - Offer Toileting every 2 Hours, in advance of need  - Initiate/Maintain bed alarm  - Obtain necessary fall risk management equipment: socks  - Apply yellow socks and bracelet for high fall risk patients  - Consider moving patient to room near nurses station  Outcome: Progressing     Problem: PAIN - ADULT  Goal: Verbalizes/displays adequate comfort level or baseline comfort level  Description: Interventions:  - Encourage patient to monitor pain and request assistance  - Assess pain using appropriate pain scale  - Administer analgesics based on type and severity of pain and evaluate response  - Implement non-pharmacological measures as appropriate and evaluate response  - Consider cultural and social influences on pain and pain management  - Notify physician/advanced practitioner if interventions unsuccessful or patient reports new pain  Outcome: Progressing     Problem: INFECTION - ADULT  Goal: Absence or prevention of progression during hospitalization  Description: INTERVENTIONS:  - Assess and monitor for signs and symptoms of infection  - Monitor lab/diagnostic results  - Monitor all insertion sites, i e  indwelling lines, tubes, and drains  - Monitor endotracheal if appropriate and nasal secretions for changes in amount and color  - [place] appropriate cooling/warming therapies per order  - Administer medications as ordered  - Instruct and encourage patient and family to use good hand hygiene technique  - Identify and instruct in appropriate isolation precautions for identified infection/condition  Outcome: Progressing     Problem: SAFETY ADULT  Goal: Maintain or return to baseline ADL function  Description: INTERVENTIONS:  -  Assess patient's ability to carry out ADLs; assess patient's baseline for ADL function and identify physical deficits which impact ability to perform ADLs (bathing, care of mouth/teeth, toileting, grooming, dressing, etc )  - Assess/evaluate cause of self-care deficits   - Assess range of motion  - Assess patient's mobility; develop plan if impaired  - Assess patient's need for assistive devices and provide as appropriate  - Encourage maximum independence but intervene and supervise when necessary  - Involve family in performance of ADLs  - Assess for home care needs following discharge   - Consider OT consult to assist with ADL evaluation and planning for discharge  - Provide patient education as appropriate  Outcome: Progressing     Problem: GENITOURINARY - ADULT  Goal: Maintains or returns to baseline urinary function  Description: INTERVENTIONS:  - Assess urinary function  - Encourage oral fluids to ensure adequate hydration if ordered  - Administer IV fluids as ordered to ensure adequate hydration  - Administer ordered medications as needed  - Offer frequent toileting  - Follow urinary retention protocol if ordered  Outcome: Progressing     Problem: SKIN/TISSUE INTEGRITY - ADULT  Goal: Skin Integrity remains intact(Skin Breakdown Prevention)  Description: Assess:  -Perform Dejuan assessment every shift  -Clean and moisturize skin every 4 hours  -Inspect skin when repositioning, toileting, and assisting with ADLS  -Assess under medical devices such as masimo every shift  -Assess extremities for adequate circulation and sensation     Bed Management:  -Have minimal linens on bed & keep smooth, unwrinkled  -Change linens as needed when moist or perspiring  -Avoid sitting or lying in one position for more than 2 hours while in bed  -Keep HOB at [address]:  -Offer bedside commode  -Assess for incontinence every 4 hours  -Use incontinent care products after each incontinent episode such as cleanser    Activity:  -Mobilize patient 3 times a day  -Encourage activity and walks on unit  -Encourage or provide ROM exercises   -Turn and reposition patient every 2 Hours  -Use appropriate equipment to lift or move patient in bed  -Instruct/ Assist with weight shifting every 2 hours when out of bed in chair  -Consider limitation of chair time 2 hour intervals    Skin Care:  -Avoid use of baby powder, tape, friction and shearing, hot water or constrictive clothing  -Relieve pressure over bony prominences using pillows  -Do not massage red bony areas    Next Steps:  -Teach patient strategies to minimize risks such as turning   -Consider consults to  interdisciplinary teams such as wound  Outcome: Progressing     Problem: MUSCULOSKELETAL - ADULT  Goal: Maintain or return mobility to safest level of function  Description: INTERVENTIONS:  - Assess patient's ability to carry out ADLs; assess patient's baseline for ADL function and identify physical deficits which impact ability to perform ADLs (bathing, care of mouth/teeth, toileting, grooming, dressing, etc )  - Assess/evaluate cause of self-care deficits   - Assess range of motion  - Assess patient's mobility  - Assess patient's need for assistive devices and provide as appropriate  - Encourage maximum independence but intervene and supervise when necessary  - Involve family in performance of ADLs  - Assess for home care needs following discharge   - Consider OT consult to assist with ADL evaluation and planning for discharge  - Provide patient education as appropriate  Outcome: Progressing  Goal: Maintain proper alignment of affected body part  Description: INTERVENTIONS:  - Support, maintain and protect limb and body alignment  - Provide patient/ family with appropriate education  Outcome: Progressing

## 2023-03-24 ENCOUNTER — APPOINTMENT (INPATIENT)
Dept: CT IMAGING | Facility: HOSPITAL | Age: 58
End: 2023-03-24

## 2023-03-24 LAB — BACTERIA UR CULT: ABNORMAL

## 2023-03-24 RX ORDER — TIZANIDINE 4 MG/1
4 TABLET ORAL EVERY 8 HOURS PRN
Status: DISCONTINUED | OUTPATIENT
Start: 2023-03-24 | End: 2023-03-24

## 2023-03-24 RX ORDER — PREDNISONE 20 MG/1
60 TABLET ORAL DAILY
Status: DISCONTINUED | OUTPATIENT
Start: 2023-03-24 | End: 2023-03-28 | Stop reason: HOSPADM

## 2023-03-24 RX ADMIN — OXYCODONE HYDROCHLORIDE 5 MG: 5 TABLET ORAL at 02:51

## 2023-03-24 RX ADMIN — HYDROMORPHONE HYDROCHLORIDE 0.5 MG: 1 INJECTION, SOLUTION INTRAMUSCULAR; INTRAVENOUS; SUBCUTANEOUS at 16:31

## 2023-03-24 RX ADMIN — HYDROMORPHONE HYDROCHLORIDE 0.5 MG: 1 INJECTION, SOLUTION INTRAMUSCULAR; INTRAVENOUS; SUBCUTANEOUS at 11:22

## 2023-03-24 RX ADMIN — PREDNISONE 60 MG: 20 TABLET ORAL at 13:37

## 2023-03-24 RX ADMIN — BACLOFEN 10 MG: 10 TABLET ORAL at 16:31

## 2023-03-24 RX ADMIN — DOCUSATE SODIUM 100 MG: 100 CAPSULE, LIQUID FILLED ORAL at 08:00

## 2023-03-24 RX ADMIN — FLUCONAZOLE 100 MG: 100 TABLET ORAL at 08:00

## 2023-03-24 RX ADMIN — DOCUSATE SODIUM 100 MG: 100 CAPSULE, LIQUID FILLED ORAL at 16:31

## 2023-03-24 RX ADMIN — KETOROLAC TROMETHAMINE 30 MG: 30 INJECTION, SOLUTION INTRAMUSCULAR; INTRAVENOUS at 09:33

## 2023-03-24 RX ADMIN — GABAPENTIN 300 MG: 300 CAPSULE ORAL at 08:00

## 2023-03-24 RX ADMIN — HYDROMORPHONE HYDROCHLORIDE 0.5 MG: 1 INJECTION, SOLUTION INTRAMUSCULAR; INTRAVENOUS; SUBCUTANEOUS at 23:08

## 2023-03-24 RX ADMIN — HYDROXYCHLOROQUINE SULFATE 200 MG: 200 TABLET ORAL at 08:00

## 2023-03-24 RX ADMIN — KETOROLAC TROMETHAMINE 30 MG: 30 INJECTION, SOLUTION INTRAMUSCULAR; INTRAVENOUS at 20:04

## 2023-03-24 RX ADMIN — HYDROXYCHLOROQUINE SULFATE 200 MG: 200 TABLET ORAL at 16:31

## 2023-03-24 RX ADMIN — OXYCODONE HYDROCHLORIDE 5 MG: 5 TABLET ORAL at 08:00

## 2023-03-24 RX ADMIN — BACLOFEN 10 MG: 10 TABLET ORAL at 20:04

## 2023-03-24 RX ADMIN — LISINOPRIL 10 MG: 10 TABLET ORAL at 08:00

## 2023-03-24 RX ADMIN — HYDROMORPHONE HYDROCHLORIDE 0.5 MG: 1 INJECTION, SOLUTION INTRAMUSCULAR; INTRAVENOUS; SUBCUTANEOUS at 03:53

## 2023-03-24 RX ADMIN — ENOXAPARIN SODIUM 40 MG: 100 INJECTION SUBCUTANEOUS at 08:00

## 2023-03-24 RX ADMIN — CEFTRIAXONE 2000 MG: 2 INJECTION, SOLUTION INTRAVENOUS at 21:25

## 2023-03-24 RX ADMIN — OXYCODONE HYDROCHLORIDE 5 MG: 5 TABLET ORAL at 13:48

## 2023-03-24 RX ADMIN — BACLOFEN 10 MG: 10 TABLET ORAL at 08:00

## 2023-03-24 RX ADMIN — KETOROLAC TROMETHAMINE 30 MG: 30 INJECTION, SOLUTION INTRAMUSCULAR; INTRAVENOUS at 03:19

## 2023-03-24 NOTE — PLAN OF CARE
Problem: Potential for Falls  Goal: Patient will remain free of falls  Description: INTERVENTIONS:  - Educate patient/family on patient safety including physical limitations  - Instruct patient to call for assistance with activity   - Consult OT/PT to assist with strengthening/mobility   - Keep Call bell within reach  - Keep bed low and locked with side rails adjusted as appropriate  - Keep care items and personal belongings within reach  - Initiate and maintain comfort rounds  - Make Fall Risk Sign visible to staff  - Offer Toileting every  Hours, in advance of need  - Initiate/Maintain alarm  - Obtain necessary fall risk management equipment:   - Apply yellow socks and bracelet for high fall risk patients  - Consider moving patient to room near nurses station  Outcome: Progressing     Problem: PAIN - ADULT  Goal: Verbalizes/displays adequate comfort level or baseline comfort level  Description: Interventions:  - Encourage patient to monitor pain and request assistance  - Assess pain using appropriate pain scale  - Administer analgesics based on type and severity of pain and evaluate response  - Implement non-pharmacological measures as appropriate and evaluate response  - Consider cultural and social influences on pain and pain management  - Notify physician/advanced practitioner if interventions unsuccessful or patient reports new pain  Outcome: Progressing     Problem: INFECTION - ADULT  Goal: Absence or prevention of progression during hospitalization  Description: INTERVENTIONS:  - Assess and monitor for signs and symptoms of infection  - Monitor lab/diagnostic results  - Monitor all insertion sites, i e  indwelling lines, tubes, and drains  - Monitor endotracheal if appropriate and nasal secretions for changes in amount and color  - Valencia appropriate cooling/warming therapies per order  - Administer medications as ordered  - Instruct and encourage patient and family to use good hand hygiene technique  - Identify and instruct in appropriate isolation precautions for identified infection/condition  Outcome: Progressing     Problem: SAFETY ADULT  Goal: Maintain or return to baseline ADL function  Description: INTERVENTIONS:  -  Assess patient's ability to carry out ADLs; assess patient's baseline for ADL function and identify physical deficits which impact ability to perform ADLs (bathing, care of mouth/teeth, toileting, grooming, dressing, etc )  - Assess/evaluate cause of self-care deficits   - Assess range of motion  - Assess patient's mobility; develop plan if impaired  - Assess patient's need for assistive devices and provide as appropriate  - Encourage maximum independence but intervene and supervise when necessary  - Involve family in performance of ADLs  - Assess for home care needs following discharge   - Consider OT consult to assist with ADL evaluation and planning for discharge  - Provide patient education as appropriate  Outcome: Progressing     Problem: GENITOURINARY - ADULT  Goal: Maintains or returns to baseline urinary function  Description: INTERVENTIONS:  - Assess urinary function  - Encourage oral fluids to ensure adequate hydration if ordered  - Administer IV fluids as ordered to ensure adequate hydration  - Administer ordered medications as needed  - Offer frequent toileting  - Follow urinary retention protocol if ordered  Outcome: Progressing     Problem: SKIN/TISSUE INTEGRITY - ADULT  Goal: Skin Integrity remains intact(Skin Breakdown Prevention)  Description: Assess:  -Perform Dejuan assessment every   -Clean and moisturize skin every   -Inspect skin when repositioning, toileting, and assisting with ADLS  -Assess under medical devices such as  every   -Assess extremities for adequate circulation and sensation     Bed Management:  -Have minimal linens on bed & keep smooth, unwrinkled  -Change linens as needed when moist or perspiring  -Avoid sitting or lying in one position for more than  hours while in bed  -Keep HOB at degrees     Toileting:  -Offer bedside commode  -Assess for incontinence every   -Use incontinent care products after each incontinent episode such as     Activity:  -Mobilize patient  times a day  -Encourage activity and walks on unit  -Encourage or provide ROM exercises   -Turn and reposition patient every  Hours  -Use appropriate equipment to lift or move patient in bed  -Instruct/ Assist with weight shifting every  when out of bed in chair  -Consider limitation of chair time  hour intervals    Skin Care:  -Avoid use of baby powder, tape, friction and shearing, hot water or constrictive clothing  -Relieve pressure over bony prominences using   -Do not massage red bony areas    Next Steps:  -Teach patient strategies to minimize risks such as    -Consider consults to  interdisciplinary teams such a  Outcome: Progressing     Problem: MUSCULOSKELETAL - ADULT  Goal: Maintain or return mobility to safest level of function  Description: INTERVENTIONS:  - Assess patient's ability to carry out ADLs; assess patient's baseline for ADL function and identify physical deficits which impact ability to perform ADLs (bathing, care of mouth/teeth, toileting, grooming, dressing, etc )  - Assess/evaluate cause of self-care deficits   - Assess range of motion  - Assess patient's mobility  - Assess patient's need for assistive devices and provide as appropriate  - Encourage maximum independence but intervene and supervise when necessary  - Involve family in performance of ADLs  - Assess for home care needs following discharge   - Consider OT consult to assist with ADL evaluation and planning for discharge  - Provide patient education as appropriate  Outcome: Progressing  Goal: Maintain proper alignment of affected body part  Description: INTERVENTIONS:  - Support, maintain and protect limb and body alignment  - Provide patient/ family with appropriate education  Outcome: Progressing     Problem: MOBILITY - ADULT  Goal: Maintain or return to baseline ADL function  Description: INTERVENTIONS:  -  Assess patient's ability to carry out ADLs; assess patient's baseline for ADL function and identify physical deficits which impact ability to perform ADLs (bathing, care of mouth/teeth, toileting, grooming, dressing, etc )  - Assess/evaluate cause of self-care deficits   - Assess range of motion  - Assess patient's mobility; develop plan if impaired  - Assess patient's need for assistive devices and provide as appropriate  - Encourage maximum independence but intervene and supervise when necessary  - Involve family in performance of ADLs  - Assess for home care needs following discharge   - Consider OT consult to assist with ADL evaluation and planning for discharge  - Provide patient education as appropriate  Outcome: Progressing  Goal: Maintains/Returns to pre admission functional level  Description: INTERVENTIONS:  - Perform BMAT or MOVE assessment daily    - Set and communicate daily mobility goal to care team and patient/family/caregiver  - Collaborate with rehabilitation services on mobility goals if consulted  - Perform Range of Motion  times a day  - Reposition patient every  hours    - Dangle patient  times a day  - Stand patient  times a day  - Ambulate patient  times a day  - Out of bed to chair  times a day   - Out of bed for meals  times a day  - Out of bed for toileting  - Record patient progress and toleration of activity level   Outcome: Progressing

## 2023-03-24 NOTE — PROGRESS NOTES
[address] [place] [address]  Progress Note  Name: Florencio Graves  MRN: [id number]  Unit/Bed#: [facility] 68 2 [facility] 87 [id number] I Date of Admission: 3/22/2023   Date of Service: 3/24/2023 I Hospital Day: 1    Assessment/Plan   * Intractable low back pain  Assessment & Plan  Started about 3 years ago  Treated with steroid injection and that lasted for about 2 years and 6 months  Now with severe back pain  She has been seen several times at [facility]   She is also beginning consultation down at a spinal [place] in [place]    But is having severe pain  She had a really bad morning and states it was actually worse than when she initially came in  I did start her on prednisone after reviewing cardiology consultation recommendations  She does feel little bit better this afternoon so hopefully the prednisone is helping  Once we get her to a tolerable level, we can try and get her home and follow-up either with spine surgery at [place] or at the [facility]  Acute cystitis with hematuria  Assessment & Plan  · Start empirically on Rocephin               Subjective:   Pain is a little better this afternoon after prednisone  She is unable to walk though  He says she stands up the pain gets severe  No loss of bowel or bladder control  Objective:     Vitals:   Temp (24hrs), Av °F (36 7 °C), Min:97 5 °F (36 4 °C), Max:98 9 °F (37 2 °C)    Temp:  [97 5 °F (36 4 °C)-98 9 °F (37 2 °C)] 97 5 °F (36 4 °C)  HR:  [78-93] 78  Resp:  [18-22] 18  BP: (115-117)/(80-82) 116/80  SpO2:  [91 %-96 %] 92 %  There is no height or weight on file to calculate BMI  Input and Output Summary (last 24 hours): Intake/Output Summary (Last 24 hours) at 3/24/2023 1712  Last data filed at 3/24/2023 0800  Gross per 24 hour   Intake 480 ml   Output --   Net 480 ml       Physical Exam:     Physical Exam  Vitals and nursing note reviewed  HENT:      Head: Normocephalic and atraumatic     Eyes: Pupils: Pupils are equal, round, and reactive to light  Cardiovascular:      Rate and Rhythm: Normal rate and regular rhythm  Heart sounds: No murmur heard  No friction rub  No gallop  Pulmonary:      Effort: Pulmonary effort is normal       Breath sounds: Normal breath sounds  No wheezing or rales  Abdominal:      General: Bowel sounds are normal       Palpations: Abdomen is soft  Tenderness: There is no abdominal tenderness  Musculoskeletal:      Right lower leg: No edema  Left lower leg: No edema                 Additional Data:     Labs:    Results from last 7 days   Lab Units 03/23/23  0522   WBC Thousand/uL 9 39   HEMOGLOBIN g/dL 12 5   HEMATOCRIT % 38 4   PLATELETS Thousands/uL 191   NEUTROS PCT % 69   LYMPHS PCT % 21   MONOS PCT % 8   EOS PCT % 1     Results from last 7 days   Lab Units 03/23/23  0522   POTASSIUM mmol/L 4 4   CHLORIDE mmol/L 97   CO2 mmol/L 30   BUN mg/dL 25   CREATININE mg/dL 0 86   CALCIUM mg/dL 10 2                       * I Have Reviewed All Lab Data     Recent Cultures (last 7 days):     Results from last 7 days   Lab Units 03/22/23  1722   URINE CULTURE  >100,000 cfu/ml Escherichia coli*         Last 24 Hours Medication List:   Current Facility-Administered Medications   Medication Dose Route Frequency Provider Last Rate   • acetaminophen  650 mg Oral Q6H PRN Kelsea Weiss PA-C     • aluminum-magnesium hydroxide-simethicone  30 mL Oral Q6H PRN Kelsea Weiss PA-C     • baclofen  10 mg Oral TID Kelsea Weiss PA-C     • cefTRIAXone  2,000 mg Intravenous Q24H Kelsea Weiss PA-C 2,000 mg (03/23/23 2140)   • diazepam  5 mg Oral Q8H PRN Kelsea Weiss PA-C     • docusate sodium  100 mg Oral BID Kelsea Weiss PA-C     • enoxaparin  40 mg Subcutaneous Daily Kelsea Weiss PA-C     • fluconazole  100 mg Oral Daily Kelsea Weiss PA-C     • gabapentin  300 mg Oral Daily Kelsea Weiss PA-C     • HYDROmorphone  0 5 mg Intravenous Q2H PRN Lovina Him, PA-C     • hydroxychloroquine  200 mg Oral BID Lovina Him, PA-C     • ketorolac  30 mg Intravenous Q6H PRN Lovina Him, DAHLIA     • lisinopril  10 mg Oral Daily Lovina Him, DAHLIA     • ondansetron  4 mg Intravenous Q6H PRN Lovina Him, DAHLIA     • oxyCODONE  5 mg Oral Q4H PRN Lovina Him, DAHLIA     • predniSONE  60 mg Oral Daily Dominic Patel DO           VTE Pharmacologic Prophylaxis:   Pharmacologic: Enoxaparin (Lovenox)      Current Length of Stay: 1 day(s)    Current Patient Status: Inpatient       Discharge Plan: home when pain better    Code Status: Level 1 - Full Code           Today, Patient Was Seen By: Iker Villavicencio DO    ** Please Note: Dictation voice to text software may have been used in the creation of this document   **

## 2023-03-24 NOTE — ASSESSMENT & PLAN NOTE
Started about 3 years ago  Treated with steroid injection and that lasted for about 2 years and 6 months  Now with severe back pain  She has been seen several times at [facility]   She is also beginning consultation down at a spinal [place] in [place]    But is having severe pain  She had a really bad morning and states it was actually worse than when she initially came in  I did start her on prednisone after reviewing cardiology consultation recommendations  She does feel little bit better this afternoon so hopefully the prednisone is helping  Once we get her to a tolerable level, we can try and get her home and follow-up either with spine surgery at [place] or at the [facility]

## 2023-03-24 NOTE — CASE MANAGEMENT
Case Management Assessment & Discharge Planning Note    Patient name Michael Fletchergurvinder  Location [facility] 2 /[facility] 2 Kurt Ribeiro* MRN [id number]  : 1965 Date 3/24/2023       Current Admission Date: 3/22/2023  Current Admission Diagnosis:Intractable low back pain   Patient Active Problem List    Diagnosis Date Noted   • Intractable low back pain 2023   • Acute cystitis with hematuria 2023   • Chronic, continuous use of opioids 2023   • Thrush, oral 2023   • Hypertension 2021   • Total knee replacement status, bilateral 2021      LOS (days): 1  Geometric Mean LOS (GMLOS) (days):   Days to GMLOS:     OBJECTIVE:    Risk of Unplanned Readmission Score: 7 6         Current admission status: Inpatient       Preferred Pharmacy:   [address] [place] [address] 8 [facility], [address] [facility]  [address] [facility] [address] [facility] [address]  Phone: [phone] Fax: [phone]    Primary Care Provider: Antone Collet, DO    Primary Insurance: BLUE CROSS  Secondary Insurance:     ASSESSMENT:  [address] [facility], [address] Representative - Spouse   Primary Phone: [phone] (Mobile)               Advance Directives  Does patient have a [address]?: Yes  Does patient have Advance Directives?: Yes  Advance Directives: Living will, Power of  for health care  Primary Contact: Davian Kemp (Spouse)   [phone]              Patient Information  Admitted from[de-identified] Home  Mental Status: Alert  During Assessment patient was accompanied by: Spouse  Assessment information provided by[de-identified] Patient  Primary Caregiver: Self  Support Systems: Spouse/significant other  What city do you live in?: [address] entry access options   Select all that apply : Stairs  Number of steps to enter home : 2  Do the steps have railings?: Yes  [facility]  In the last 12 months, was there a time when you were not able to pay the mortgage or rent on time?: No  In the last 12 months, how many places have you lived?: 1  In the last 12 months, was there a time when you did not have a steady place to sleep or slept in a shelter (including now)?: No  Homeless/housing insecurity resource given?: N/A  Living Arrangements: Lives w/ Spouse/significant other  Is patient a ?: No    Activities of Daily Living Prior to Admission  Functional Status: Independent  Completes ADLs independently?: Yes  Ambulates independently?: Yes  Does patient use assisted devices?: Yes  Assisted Devices (DME) used: Mary Luis  Does patient currently own DME?: Yes  What DME does the patient currently own?: [employer] Juan  Does patient have a history of Outpatient Therapy (PT/OT)?: Yes (thru [facility])  Does patient have a history of HHC?: No  Does patient currently have [facility]?: No         Patient Information Continued  Income Source: Employed  Does patient have prescription coverage?: Yes  Within the past 12 months, you worried that your food would run out before you got the money to buy more : Never true  Within the past 12 months, the food you bought just didn't last and you didn't have money to get more : Never true  Food insecurity resource given?: N/A  Does patient receive dialysis treatments?: No  Does patient have a history of substance abuse?: No         Means of Transportation  Means of Transport to Appts[de-identified] Drives Self  In the past 12 months, has lack of transportation kept you from medical appointments or from getting medications?: No  In the past 12 months, has lack of transportation kept you from meetings, work, or from getting things needed for daily living?: No  Was application for public transport provided?: N/A        DISCHARGE DETAILS:    Discharge planning discussed with[de-identified] patient and spouse  Freedom of Choice: Yes  Comments - Freedom of Choice: Provided list of OPPT providers  CM contacted family/caregiver?: Yes  Were Treatment Team discharge recommendations reviewed with patient/caregiver?: Yes  Did patient/caregiver verbalize understanding of patient care needs?: Yes       Contacts  Patient Contacts: Nikko Peraza (Spouse)   [phone]  Relationship to Patient[de-identified] Family  Contact Method:  In Person  Reason/Outcome: Continuity of Care, Emergency Contact, Discharge [address]         Is the patient interested in [facility] at discharge?: No    DME Referral Provided  Referral made for DME?: No    Other Referral/Resources/Interventions Provided:  Interventions: Outpatient PT  Referral Comments: Provided list of OPPT providers         Treatment Team Recommendation: Home  Discharge Destination Plan[de-identified] Home  Transport at Discharge : Automobile (spouse)

## 2023-03-24 NOTE — PLAN OF CARE
Problem: Potential for Falls  Goal: Patient will remain free of falls  Description: INTERVENTIONS:  - Educate patient/family on patient safety including physical limitations  - Instruct patient to call for assistance with activity   - Consult OT/PT to assist with strengthening/mobility   - Keep Call bell within reach  - Keep bed low and locked with side rails adjusted as appropriate  - Keep care items and personal belongings within reach  - Initiate and maintain comfort rounds  - Make Fall Risk Sign visible to staff  - Offer Toileting every 2 Hours, in advance of need  - Initiate/Maintain bed alarm  - Obtain necessary fall risk management equipment: socks  - Apply yellow socks and bracelet for high fall risk patients  - Consider moving patient to room near nurses station  Outcome: Progressing     Problem: PAIN - ADULT  Goal: Verbalizes/displays adequate comfort level or baseline comfort level  Description: Interventions:  - Encourage patient to monitor pain and request assistance  - Assess pain using appropriate pain scale  - Administer analgesics based on type and severity of pain and evaluate response  - Implement non-pharmacological measures as appropriate and evaluate response  - Consider cultural and social influences on pain and pain management  - Notify physician/advanced practitioner if interventions unsuccessful or patient reports new pain  Outcome: Progressing     Problem: INFECTION - ADULT  Goal: Absence or prevention of progression during hospitalization  Description: INTERVENTIONS:  - Assess and monitor for signs and symptoms of infection  - Monitor lab/diagnostic results  - Monitor all insertion sites, i e  indwelling lines, tubes, and drains  - Monitor endotracheal if appropriate and nasal secretions for changes in amount and color  - Junction appropriate cooling/warming therapies per order  - Administer medications as ordered  - Instruct and encourage patient and family to use good hand hygiene technique  - Identify and instruct in appropriate isolation precautions for identified infection/condition  Outcome: Progressing     Problem: SAFETY ADULT  Goal: Maintain or return to baseline ADL function  Description: INTERVENTIONS:  -  Assess patient's ability to carry out ADLs; assess patient's baseline for ADL function and identify physical deficits which impact ability to perform ADLs (bathing, care of mouth/teeth, toileting, grooming, dressing, etc )  - Assess/evaluate cause of self-care deficits   - Assess range of motion  - Assess patient's mobility; develop plan if impaired  - Assess patient's need for assistive devices and provide as appropriate  - Encourage maximum independence but intervene and supervise when necessary  - Involve family in performance of ADLs  - Assess for home care needs following discharge   - Consider OT consult to assist with ADL evaluation and planning for discharge  - Provide patient education as appropriate  Outcome: Progressing     Problem: GENITOURINARY - ADULT  Goal: Maintains or returns to baseline urinary function  Description: INTERVENTIONS:  - Assess urinary function  - Encourage oral fluids to ensure adequate hydration if ordered  - Administer IV fluids as ordered to ensure adequate hydration  - Administer ordered medications as needed  - Offer frequent toileting  - Follow urinary retention protocol if ordered  Outcome: Progressing     Problem: SKIN/TISSUE INTEGRITY - ADULT  Goal: Skin Integrity remains intact(Skin Breakdown Prevention)  Description: Assess:  -Perform Dejuan assessment every shift  -Clean and moisturize skin every 3 hours  -Inspect skin when repositioning, toileting, and assisting with ADLS  -Assess under medical devices such as masimo every shift  -Assess extremities for adequate circulation and sensation     Bed Management:  -Have minimal linens on bed & keep smooth, unwrinkled  -Change linens as needed when moist or perspiring  -Avoid sitting or lying in one position for more than 2 hours while in bed  -Keep HOB at 30 degrees     Toileting:  -Offer bedside commode  -Assess for incontinence every 3 hours  -Use incontinent care products after each incontinent episode such as cleanser    Activity:  -Mobilize patient 3 times a day  -Encourage activity and walks on unit  -Encourage or provide ROM exercises   -Turn and reposition patient every 2 Hours  -Use appropriate equipment to lift or move patient in bed  -Instruct/ Assist with weight shifting every 2 hours when out of bed in chair  -Consider limitation of chair time 2 hour intervals    Skin Care:  -Avoid use of baby powder, tape, friction and shearing, hot water or constrictive clothing  -Relieve pressure over bony prominences using pillows  -Do not massage red bony areas    Next Steps:  -Teach patient strategies to minimize risks such as turning   -Consider consults to  interdisciplinary teams such as wound  Outcome: Progressing     Problem: MUSCULOSKELETAL - ADULT  Goal: Maintain or return mobility to safest level of function  Description: INTERVENTIONS:  - Assess patient's ability to carry out ADLs; assess patient's baseline for ADL function and identify physical deficits which impact ability to perform ADLs (bathing, care of mouth/teeth, toileting, grooming, dressing, etc )  - Assess/evaluate cause of self-care deficits   - Assess range of motion  - Assess patient's mobility  - Assess patient's need for assistive devices and provide as appropriate  - Encourage maximum independence but intervene and supervise when necessary  - Involve family in performance of ADLs  - Assess for home care needs following discharge   - Consider OT consult to assist with ADL evaluation and planning for discharge  - Provide patient education as appropriate  Outcome: Progressing  Goal: Maintain proper alignment of affected body part  Description: INTERVENTIONS:  - Support, maintain and protect limb and body alignment  - Provide patient/ family with appropriate education  Outcome: Progressing     Problem: MOBILITY - ADULT  Goal: Maintain or return to baseline ADL function  Description: INTERVENTIONS:  -  Assess patient's ability to carry out ADLs; assess patient's baseline for ADL function and identify physical deficits which impact ability to perform ADLs (bathing, care of mouth/teeth, toileting, grooming, dressing, etc )  - Assess/evaluate cause of self-care deficits   - Assess range of motion  - Assess patient's mobility; develop plan if impaired  - Assess patient's need for assistive devices and provide as appropriate  - Encourage maximum independence but intervene and supervise when necessary  - Involve family in performance of ADLs  - Assess for home care needs following discharge   - Consider OT consult to assist with ADL evaluation and planning for discharge  - Provide patient education as appropriate  Outcome: Progressing  Goal: Maintains/Returns to pre admission functional level  Description: INTERVENTIONS:  - Perform BMAT or MOVE assessment daily    - Set and communicate daily mobility goal to care team and patient/family/caregiver  - Collaborate with rehabilitation services on mobility goals if consulted  - Perform Range of Motion 3 times a day  - Reposition patient every 2 hours    - Dangle patient 3 times a day  - Stand patient 3 times a day  - Ambulate patient 3 times a day  - Out of bed to chair 3 times a day   - Out of bed for meals 3 times a day  - Out of bed for toileting  - Record patient progress and toleration of activity level   Outcome: Progressing

## 2023-03-24 NOTE — PROGRESS NOTES
Orthopedics  Douglas Michel 56 y o  female MRN: [id number]  Unit/Bed#: [place] 2 -02        Subjective:  56 y  o female with acute on chronic intractable lower back pain  Patient reports the pain is more intense today than yesterday  It is all located central and right lower back  Movement worsens symptoms  Denies any radiating symptoms to the LE  No bowel or bladder incontinence, no saddle anesthesia        Objective:    Labs:  0   Lab Value Date/Time    HCT 38 4 03/23/2023 0522    HCT 39 7 03/22/2023 1602    HGB 12 5 03/23/2023 0522    HGB 13 0 03/22/2023 1602    WBC 9 39 03/23/2023 0522    WBC 10 57 (H) 03/22/2023 1602       Meds:    Current Facility-Administered Medications:   •  acetaminophen (TYLENOL) tablet 650 mg, 650 mg, Oral, Q6H PRN, Kelsea Weiss PA-C  •  aluminum-magnesium hydroxide-simethicone (MYLANTA) oral suspension 30 mL, 30 mL, Oral, Q6H PRN, Kelsea Weiss PA-C  •  baclofen tablet 10 mg, 10 mg, Oral, TID, Kelsea Weiss PA-C, 10 mg at 03/24/23 0800  •  cefTRIAXone (ROCEPHIN) IVPB (premix in dextrose) 2,000 mg 50 mL, 2,000 mg, Intravenous, Q24H, Kelsea Weiss PA-C, Last Rate: 100 mL/hr at 03/23/23 2140, 2,000 mg at 03/23/23 2140  •  diazepam (VALIUM) tablet 5 mg, 5 mg, Oral, Q8H PRN, Kelsea Weiss PA-C, 5 mg at 03/23/23 8494  •  docusate sodium (COLACE) capsule 100 mg, 100 mg, Oral, BID, Kelsea Weiss PA-C, 100 mg at 03/24/23 0800  •  enoxaparin (LOVENOX) subcutaneous injection 40 mg, 40 mg, Subcutaneous, Daily, Kelsea Weiss PA-C, 40 mg at 03/24/23 0800  •  fluconazole (DIFLUCAN) tablet 100 mg, 100 mg, Oral, Daily, Kelsea Weiss PA-C, 100 mg at 03/24/23 0800  •  gabapentin (NEURONTIN) capsule 300 mg, 300 mg, Oral, Daily, Kelsea Weiss PA-C, 300 mg at 03/24/23 0800  •  HYDROmorphone (DILAUDID) injection 0 5 mg, 0 5 mg, Intravenous, Q2H PRN, Kelsea Weiss PA-C, 0 5 mg at 03/24/23 9815  •  hydroxychloroquine (PLAQUENIL) tablet 200 mg, 200 mg, Oral, BID, Bettina Sanchez PA-C, 200 mg at 03/24/23 0800  •  ketorolac (TORADOL) injection 30 mg, 30 mg, Intravenous, Q6H PRN, Bettina Sanchez PA-C, 30 mg at 03/24/23 0291  •  lisinopril (ZESTRIL) tablet 10 mg, 10 mg, Oral, Daily, Bettina Sanchez PA-C, 10 mg at 03/24/23 0800  •  ondansetron [facility]) injection 4 mg, 4 mg, Intravenous, Q6H PRN, Bettina Sanchez PA-C  •  oxyCODONE (ROXICODONE) IR tablet 5 mg, 5 mg, Oral, Q4H PRN, CYNTHIA Crespo-SHANTI, 5 mg at 03/24/23 0800    Blood Culture:   No results found for: BLOODCX    Wound Culture:   No results found for: WOUNDCULT    Ins and Outs:  I/O last 24 hours: In: 480 [P O :480]  Out: -       Orthopedic Physical Exam:  Vitals:    03/24/23 0747   BP: 115/81   Pulse: 86   Resp: 20   Temp: 97 6 °F (36 4 °C)   SpO2: 96%   Patient in bed in the seated position, with the right leg outstretched, and the left leg flexed  NAD  Breathing unlabored  bilateral Lower Extremity extremity:  · Skin intact  · Midline lumbar spine and right lumbar paraspinal muscle tenderness to palpation  · 5/5 strength, L2-S1 bilaterally  · Sensation intact, L2-S1 bilaterally  · Able to straight leg raise bilaterally without any LE radiating pain  · Lower extremities are warm and well perfused    _*_*_*_*_*_*_*_*_*_*_*_*_*_*_*_*_*_*_*_*_*_*_*_*_*_*_*_*_*_*_*_*_*_*_*_*_*_*_*_*_*    Assessment: 56 y  o female with acute on chronic intractable lower back pain  Currently no complaints of radicular symptoms, or any red flag complaints  Plan:  · Given her worsening lower back pain, will order CT scan Lumbar spine without contrast as inpatient for further evaluation  • Patient states she has a phone conference at [facility]  · DVT prophylaxis per primary  · Analgesics per primary  Recommend muscle relaxant and course of oral steroids if able medically    · Recommend stat neurosurgery consult if patient develops any red flag symptoms like sudden loss of bladder or bowel function or saddle anesthesia  · PT/OT as able, ambulate out of bed is recommended  · Dispo: Ortho will follow for results of CT scan              Cinthya Anand PA-C

## 2023-03-25 RX ORDER — POLYETHYLENE GLYCOL 3350 17 G/17G
17 POWDER, FOR SOLUTION ORAL DAILY
Status: DISCONTINUED | OUTPATIENT
Start: 2023-03-26 | End: 2023-03-27

## 2023-03-25 RX ORDER — OXYCODONE HYDROCHLORIDE 10 MG/1
10 TABLET ORAL EVERY 4 HOURS PRN
Status: DISCONTINUED | OUTPATIENT
Start: 2023-03-25 | End: 2023-03-28 | Stop reason: HOSPADM

## 2023-03-25 RX ORDER — LORAZEPAM 1 MG/1
1 TABLET ORAL
Status: COMPLETED | OUTPATIENT
Start: 2023-03-26 | End: 2023-03-26

## 2023-03-25 RX ORDER — OXYCODONE HYDROCHLORIDE 5 MG/1
5 TABLET ORAL EVERY 4 HOURS PRN
Status: DISCONTINUED | OUTPATIENT
Start: 2023-03-25 | End: 2023-03-28 | Stop reason: HOSPADM

## 2023-03-25 RX ORDER — BISACODYL 5 MG/1
10 TABLET, DELAYED RELEASE ORAL DAILY PRN
Status: DISCONTINUED | OUTPATIENT
Start: 2023-03-25 | End: 2023-03-28 | Stop reason: HOSPADM

## 2023-03-25 RX ADMIN — DOCUSATE SODIUM 100 MG: 100 CAPSULE, LIQUID FILLED ORAL at 17:37

## 2023-03-25 RX ADMIN — OXYCODONE HYDROCHLORIDE 10 MG: 10 TABLET ORAL at 21:33

## 2023-03-25 RX ADMIN — BACLOFEN 10 MG: 10 TABLET ORAL at 14:36

## 2023-03-25 RX ADMIN — BACLOFEN 10 MG: 10 TABLET ORAL at 21:34

## 2023-03-25 RX ADMIN — PREDNISONE 60 MG: 20 TABLET ORAL at 09:01

## 2023-03-25 RX ADMIN — OXYCODONE HYDROCHLORIDE 5 MG: 5 TABLET ORAL at 09:53

## 2023-03-25 RX ADMIN — HYDROMORPHONE HYDROCHLORIDE 0.5 MG: 1 INJECTION, SOLUTION INTRAMUSCULAR; INTRAVENOUS; SUBCUTANEOUS at 09:02

## 2023-03-25 RX ADMIN — OXYCODONE HYDROCHLORIDE 5 MG: 5 TABLET ORAL at 05:55

## 2023-03-25 RX ADMIN — LISINOPRIL 10 MG: 10 TABLET ORAL at 09:02

## 2023-03-25 RX ADMIN — BISACODYL 10 MG: 5 TABLET, COATED ORAL at 17:37

## 2023-03-25 RX ADMIN — DIAZEPAM 5 MG: 5 TABLET ORAL at 14:34

## 2023-03-25 RX ADMIN — HYDROMORPHONE HYDROCHLORIDE 0.5 MG: 1 INJECTION, SOLUTION INTRAMUSCULAR; INTRAVENOUS; SUBCUTANEOUS at 14:33

## 2023-03-25 RX ADMIN — ENOXAPARIN SODIUM 40 MG: 100 INJECTION SUBCUTANEOUS at 09:02

## 2023-03-25 RX ADMIN — DIAZEPAM 5 MG: 5 TABLET ORAL at 21:34

## 2023-03-25 RX ADMIN — CEFTRIAXONE 2000 MG: 2 INJECTION, SOLUTION INTRAVENOUS at 21:38

## 2023-03-25 RX ADMIN — HYDROXYCHLOROQUINE SULFATE 200 MG: 200 TABLET ORAL at 09:02

## 2023-03-25 RX ADMIN — LISINOPRIL 10 MG: 10 TABLET ORAL at 09:08

## 2023-03-25 RX ADMIN — DOCUSATE SODIUM 100 MG: 100 CAPSULE, LIQUID FILLED ORAL at 09:01

## 2023-03-25 RX ADMIN — HYDROMORPHONE HYDROCHLORIDE 0.5 MG: 1 INJECTION, SOLUTION INTRAMUSCULAR; INTRAVENOUS; SUBCUTANEOUS at 12:02

## 2023-03-25 RX ADMIN — HYDROXYCHLOROQUINE SULFATE 200 MG: 200 TABLET ORAL at 17:37

## 2023-03-25 RX ADMIN — OXYCODONE HYDROCHLORIDE 10 MG: 10 TABLET ORAL at 17:37

## 2023-03-25 RX ADMIN — BACLOFEN 10 MG: 10 TABLET ORAL at 09:01

## 2023-03-25 RX ADMIN — GABAPENTIN 300 MG: 300 CAPSULE ORAL at 09:01

## 2023-03-25 RX ADMIN — OXYCODONE HYDROCHLORIDE 5 MG: 5 TABLET ORAL at 14:34

## 2023-03-25 RX ADMIN — FLUCONAZOLE 100 MG: 100 TABLET ORAL at 09:01

## 2023-03-25 NOTE — PROGRESS NOTES
Orthopedics  Frederick Huynh 56 y o  female MRN: [id number]  Unit/Bed#: [place] 2 -02        Subjective:  56 y  o female with acute on chronic intractable lower back pain  Patient was seen and examined at bedside this morning  She states that she does note a slight decrease in pain, stating her pain is a 5/10 this morning  She again denies any numbness or tingling in the bilateral lower extremities  She denies bowel or bladder incontinence or saddle anesthesia  She denies weakness or loss of strengthen in the lower legs as well  She reports that she did have her virtual appointment with the [facility] provider yesterday and states that they would likely be reaching out to Dr Craven to discuss the patient's case        Objective:    Labs:  0   Lab Value Date/Time    HCT 38 4 03/23/2023 0522    HCT 39 7 03/22/2023 1602    HGB 12 5 03/23/2023 0522    HGB 13 0 03/22/2023 1602    WBC 9 39 03/23/2023 0522    WBC 10 57 (H) 03/22/2023 1602       Meds:    Current Facility-Administered Medications:   •  acetaminophen (TYLENOL) tablet 650 mg, 650 mg, Oral, Q6H PRN, Linn Grams, PA-C  •  aluminum-magnesium hydroxide-simethicone (MYLANTA) oral suspension 30 mL, 30 mL, Oral, Q6H PRN, Linn Grams, PA-C  •  baclofen tablet 10 mg, 10 mg, Oral, TID, Linn Grams, PA-C, 10 mg at 03/24/23 2004  •  cefTRIAXone (ROCEPHIN) IVPB (premix in dextrose) 2,000 mg 50 mL, 2,000 mg, Intravenous, Q24H, Linn Grams, PA-C, Last Rate: 100 mL/hr at 03/24/23 2125, 2,000 mg at 03/24/23 2125  •  diazepam (VALIUM) tablet 5 mg, 5 mg, Oral, Q8H PRN, Linn Grams, PA-C, 5 mg at 03/23/23 2691  •  docusate sodium (COLACE) capsule 100 mg, 100 mg, Oral, BID, Linn Grams, PA-C, 100 mg at 03/24/23 1631  •  enoxaparin (LOVENOX) subcutaneous injection 40 mg, 40 mg, Subcutaneous, Daily, Linn Grams, PA-C, 40 mg at 03/24/23 0800  •  fluconazole (DIFLUCAN) tablet 100 mg, 100 mg, Oral, Daily, Linn Grams, PA-C, 100 mg at 03/24/23 0800  •  gabapentin (NEURONTIN) capsule 300 mg, 300 mg, Oral, Daily, Darrall Cera, PA-C, 300 mg at 03/24/23 0800  •  HYDROmorphone (DILAUDID) injection 0 5 mg, 0 5 mg, Intravenous, Q2H PRN, Darrall Cera, PA-C, 0 5 mg at 03/24/23 2308  •  hydroxychloroquine (PLAQUENIL) tablet 200 mg, 200 mg, Oral, BID, Darrall Cera, PA-C, 200 mg at 03/24/23 1631  •  lisinopril (ZESTRIL) tablet 10 mg, 10 mg, Oral, Daily, Darrall Cera, PA-C, 10 mg at 03/24/23 0800  •  ondansetron (ZOFRAN) injection 4 mg, 4 mg, Intravenous, Q6H PRN, Darrall Cera, PA-C  •  oxyCODONE (ROXICODONE) IR tablet 5 mg, 5 mg, Oral, Q4H PRN, Darrall Cera, PA-C, 5 mg at 03/25/23 6222  •  predniSONE tablet 60 mg, 60 mg, Oral, Daily, Dominic S Prechtel, DO, 60 mg at 03/24/23 1337    Blood Culture:   No results found for: BLOODCX    Wound Culture:   No results found for: WOUNDCULT    Ins and Outs:  I/O last 24 hours: In: 480 [P O :480]  Out: -       Orthopedic Physical Exam:  Vitals:    03/25/23 0732   BP: 104/64   Pulse: 67   Resp: 17   Temp: 97 7 °F (36 5 °C)   SpO2: 97%   Patient in bed in the seated position, with the right leg outstretched, and the left leg flexed  NAD  Breathing unlabored  bilateral Lower Extremity extremity:  · Skin intact  · Midline lumbar spine and right lumbar paraspinal muscle tenderness to palpation  · 5/5 strength, L2-S1 bilaterally  · Sensation intact, L2-S1 bilaterally  · Able to straight leg raise bilaterally without any LE radiating pain  · Lower extremities are warm and well perfused    Imaging:  CT scan of the lumbar spine was reviewed and demonstrates mild levoscoliosis apex of L3 and multilevel DDD with mild central canal narrowing at the L2-3 and L4-5 levels      _*_*_*_*_*_*_*_*_*_*_*_*_*_*_*_*_*_*_*_*_*_*_*_*_*_*_*_*_*_*_*_*_*_*_*_*_*_*_*_*_*    Assessment: 56 y  o female with acute on chronic intractable lower back pain    Currently no complaints of radicular symptoms, or any red flag complaints  Plan:  · CT scan reviewed and demonstrates multilevel DDD with mild central canal narrowing at L2-3 and L4-5 levels  · Medical management for pain control  · No plan for surgical intervention unless specified by Dr Craven in the future   • [facility] may contact Dr Craven if needed  · DVT prophylaxis per primary  · Recommend stat neurosurgery consult if patient develops any red flag symptoms like sudden loss of bladder or bowel function or saddle anesthesia  · PT/OT as able, ambulate out of bed is recommended  · Dispo: Ortho signing off             [place], [place]

## 2023-03-25 NOTE — PROGRESS NOTES
[address] [place] [address]  Progress Note  Name: Natasha Chowdary  MRN: [id number]  Unit/Bed#: [address] [address] 212-02 I Date of Admission: 3/22/2023   Date of Service: 3/25/2023 I Hospital Day: 2    Assessment/Plan   * Intractable low back pain  Assessment & Plan  Started about 3 years ago  Treated with steroid injection and that lasted for about 2 years and 6 months  Now with severe back pain  She has been seen several times at [facility]   She is also beginning consultation down at a spinal [place] in [place]    But is having severe pain  She had a really bad morning and states it was actually worse than when she initially came in  Not any better this afternoon, started prednisone yesterday  (she was some better this am, but took a shower)    Will order MRI of lumbar spine  Acute cystitis with hematuria  Assessment & Plan  · Start empirically on Rocephin               Subjective:   Feels worse this afternoon   She did take a shower, so perhaps she aggravated her back  No loss of bowel nor bladder control  No weakness to her legs  Objective:     Vitals:   Temp (24hrs), Av 6 °F (36 4 °C), Min:97 5 °F (36 4 °C), Max:97 7 °F (36 5 °C)    Temp:  [97 5 °F (36 4 °C)-97 7 °F (36 5 °C)] 97 5 °F (36 4 °C)  HR:  [67-79] 79  Resp:  [17] 17  BP: (100-146)/(64-99) 100/69  SpO2:  [93 %-97 %] 93 %  There is no height or weight on file to calculate BMI  Input and Output Summary (last 24 hours):     No intake or output data in the 24 hours ending 23 1616    Physical Exam:     Physical Exam  Vitals and nursing note reviewed  HENT:      Head: Normocephalic and atraumatic  Eyes:      Pupils: Pupils are equal, round, and reactive to light  Cardiovascular:      Rate and Rhythm: Normal rate and regular rhythm  Heart sounds: No murmur heard  No friction rub  No gallop  Pulmonary:      Effort: Pulmonary effort is normal       Breath sounds: Normal breath sounds  No wheezing or rales  Abdominal:      General: Bowel sounds are normal       Palpations: Abdomen is soft  Tenderness: There is no abdominal tenderness  Musculoskeletal:      Right lower leg: No edema  Left lower leg: No edema                 Additional Data:     Labs:    Results from last 7 days   Lab Units 03/23/23  0522   WBC Thousand/uL 9 39   HEMOGLOBIN g/dL 12 5   HEMATOCRIT % 38 4   PLATELETS Thousands/uL 191   NEUTROS PCT % 69   LYMPHS PCT % 21   MONOS PCT % 8   EOS PCT % 1     Results from last 7 days   Lab Units 03/23/23  0522   POTASSIUM mmol/L 4 4   CHLORIDE mmol/L 97   CO2 mmol/L 30   BUN mg/dL 25   CREATININE mg/dL 0 86   CALCIUM mg/dL 10 2                       * I Have Reviewed All Lab Data     Recent Cultures (last 7 days):     Results from last 7 days   Lab Units 03/22/23  1722   URINE CULTURE  >100,000 cfu/ml Escherichia coli*         Last 24 Hours Medication List:   Current Facility-Administered Medications   Medication Dose Route Frequency Provider Last Rate   • acetaminophen  650 mg Oral Q6H PRN Ruth Bright PA-C     • aluminum-magnesium hydroxide-simethicone  30 mL Oral Q6H PRN Ruth Bright PA-C     • baclofen  10 mg Oral TID Ruth Bright PA-C     • cefTRIAXone  2,000 mg Intravenous Q24H Ruth Bright PA-C 2,000 mg (03/24/23 2125)   • diazepam  5 mg Oral Q8H PRN Ruth Bright PA-C     • docusate sodium  100 mg Oral BID Ruth Bright PA-C     • enoxaparin  40 mg Subcutaneous Daily Ruth Bright PA-C     • fluconazole  100 mg Oral Daily Ruth Bright PA-C     • gabapentin  300 mg Oral Daily Ruth Bright PA-C     • HYDROmorphone  0 5 mg Intravenous Q2H PRN Ruth Bright PA-C     • hydroxychloroquine  200 mg Oral BID Ruth Bright PA-C     • lisinopril  10 mg Oral Daily Ruth Bright PA-C     • [START ON 3/26/2023] LORazepam  1 mg Oral On Call Tammi Patel,      • ondansetron  4 mg Intravenous Q6H PRN Ruth Bright PA-C • oxyCODONE  10 mg Oral Q4H PRN Hubert Patel,      • oxyCODONE  5 mg Oral Q4H PRN Hubert Patel,      • predniSONE  60 mg Oral Daily Dominic Patel DO           VTE Pharmacologic Prophylaxis:   Pharmacologic: Enoxaparin (Lovenox)      Current Length of Stay: 2 day(s)    Current Patient Status: Inpatient       Discharge Plan: when pain better  Check lumbar MRI    Code Status: Level 1 - Full Code           Today, Patient Was Seen By: Pro Linares DO    ** Please Note: Dictation voice to text software may have been used in the creation of this document   **

## 2023-03-25 NOTE — ASSESSMENT & PLAN NOTE
Started about 3 years ago  Treated with steroid injection and that lasted for about 2 years and 6 months  Now with severe back pain  She has been seen several times at [facility]   She is also beginning consultation down at a spinal [place] in [place]    But is having severe pain  She had a really bad morning and states it was actually worse than when she initially came in  Not any better this afternoon, started prednisone yesterday  (she was some better this am, but took a shower)    Will order MRI of lumbar spine

## 2023-03-25 NOTE — PLAN OF CARE
Problem: Potential for Falls  Goal: Patient will remain free of falls  Description: INTERVENTIONS:  - Educate patient/family on patient safety including physical limitations  - Instruct patient to call for assistance with activity   - Consult OT/PT to assist with strengthening/mobility   - Keep Call bell within reach  - Keep bed low and locked with side rails adjusted as appropriate  - Keep care items and personal belongings within reach  - Initiate and maintain comfort rounds  - Make Fall Risk Sign visible to staff  - Offer Toileting every  Hours, in advance of need  - Initiate/Maintain alarm  - Obtain necessary fall risk management equipment:   - Apply yellow socks and bracelet for high fall risk patients  - Consider moving patient to room near nurses station  Outcome: Progressing     Problem: PAIN - ADULT  Goal: Verbalizes/displays adequate comfort level or baseline comfort level  Description: Interventions:  - Encourage patient to monitor pain and request assistance  - Assess pain using appropriate pain scale  - Administer analgesics based on type and severity of pain and evaluate response  - Implement non-pharmacological measures as appropriate and evaluate response  - Consider cultural and social influences on pain and pain management  - Notify physician/advanced practitioner if interventions unsuccessful or patient reports new pain  Outcome: Progressing     Problem: INFECTION - ADULT  Goal: Absence or prevention of progression during hospitalization  Description: INTERVENTIONS:  - Assess and monitor for signs and symptoms of infection  - Monitor lab/diagnostic results  - Monitor all insertion sites, i e  indwelling lines, tubes, and drains  - Monitor endotracheal if appropriate and nasal secretions for changes in amount and color  - [place] appropriate cooling/warming therapies per order  - Administer medications as ordered  - Instruct and encourage patient and family to use good hand hygiene technique  - Identify and instruct in appropriate isolation precautions for identified infection/condition  Outcome: Progressing     Problem: SAFETY ADULT  Goal: Maintain or return to baseline ADL function  Description: INTERVENTIONS:  -  Assess patient's ability to carry out ADLs; assess patient's baseline for ADL function and identify physical deficits which impact ability to perform ADLs (bathing, care of mouth/teeth, toileting, grooming, dressing, etc )  - Assess/evaluate cause of self-care deficits   - Assess range of motion  - Assess patient's mobility; develop plan if impaired  - Assess patient's need for assistive devices and provide as appropriate  - Encourage maximum independence but intervene and supervise when necessary  - Involve family in performance of ADLs  - Assess for home care needs following discharge   - Consider OT consult to assist with ADL evaluation and planning for discharge  - Provide patient education as appropriate  Outcome: Progressing     Problem: GENITOURINARY - ADULT  Goal: Maintains or returns to baseline urinary function  Description: INTERVENTIONS:  - Assess urinary function  - Encourage oral fluids to ensure adequate hydration if ordered  - Administer IV fluids as ordered to ensure adequate hydration  - Administer ordered medications as needed  - Offer frequent toileting  - Follow urinary retention protocol if ordered  Outcome: Progressing     Problem: SKIN/TISSUE INTEGRITY - ADULT  Goal: Skin Integrity remains intact(Skin Breakdown Prevention)  Description: Assess:  -Perform Dejuan assessment every   -Clean and moisturize skin every   -Inspect skin when repositioning, toileting, and assisting with ADLS  -Assess under medical devices such as  every   -Assess extremities for adequate circulation and sensation     Bed Management:  -Have minimal linens on bed & keep smooth, unwrinkled  -Change linens as needed when moist or perspiring  -Avoid sitting or lying in one position for more than  hours while in bed  -Keep HOB at degrees     Toileting:  -Offer bedside commode  -Assess for incontinence every   -Use incontinent care products after each incontinent episode such as     Activity:  -Mobilize patient  times a day  -Encourage activity and walks on unit  -Encourage or provide ROM exercises   -Turn and reposition patient every  Hours  -Use appropriate equipment to lift or move patient in bed  -Instruct/ Assist with weight shifting every  when out of bed in chair  -Consider limitation of chair time  hour intervals    Skin Care:  -Avoid use of baby powder, tape, friction and shearing, hot water or constrictive clothing  -Relieve pressure over bony prominences using   -Do not massage red bony areas    Next Steps:  -Teach patient strategies to minimize risks such as    -Consider consults to  interdisciplinary teams such as  Outcome: Progressing     Problem: MUSCULOSKELETAL - ADULT  Goal: Maintain or return mobility to safest level of function  Description: INTERVENTIONS:  - Assess patient's ability to carry out ADLs; assess patient's baseline for ADL function and identify physical deficits which impact ability to perform ADLs (bathing, care of mouth/teeth, toileting, grooming, dressing, etc )  - Assess/evaluate cause of self-care deficits   - Assess range of motion  - Assess patient's mobility  - Assess patient's need for assistive devices and provide as appropriate  - Encourage maximum independence but intervene and supervise when necessary  - Involve family in performance of ADLs  - Assess for home care needs following discharge   - Consider OT consult to assist with ADL evaluation and planning for discharge  - Provide patient education as appropriate  Outcome: Progressing  Goal: Maintain proper alignment of affected body part  Description: INTERVENTIONS:  - Support, maintain and protect limb and body alignment  - Provide patient/ family with appropriate education  Outcome: Progressing     Problem: MOBILITY - ADULT  Goal: Maintain or return to baseline ADL function  Description: INTERVENTIONS:  -  Assess patient's ability to carry out ADLs; assess patient's baseline for ADL function and identify physical deficits which impact ability to perform ADLs (bathing, care of mouth/teeth, toileting, grooming, dressing, etc )  - Assess/evaluate cause of self-care deficits   - Assess range of motion  - Assess patient's mobility; develop plan if impaired  - Assess patient's need for assistive devices and provide as appropriate  - Encourage maximum independence but intervene and supervise when necessary  - Involve family in performance of ADLs  - Assess for home care needs following discharge   - Consider OT consult to assist with ADL evaluation and planning for discharge  - Provide patient education as appropriate  Outcome: Progressing  Goal: Maintains/Returns to pre admission functional level  Description: INTERVENTIONS:  - Perform BMAT or MOVE assessment daily    - Set and communicate daily mobility goal to care team and patient/family/caregiver  - Collaborate with rehabilitation services on mobility goals if consulted  - Perform Range of Motion  times a day  - Reposition patient every  hours    - Dangle patient  times a day  - Stand patient  times a day  - Ambulate patient  times a day  - Out of bed to chair  times a day   - Out of bed for meals  times a day  - Out of bed for toileting  - Record patient progress and toleration of activity level   Outcome: Progressing

## 2023-03-26 ENCOUNTER — APPOINTMENT (INPATIENT)
Dept: MRI IMAGING | Facility: HOSPITAL | Age: 58
End: 2023-03-26

## 2023-03-26 RX ORDER — LORAZEPAM 2 MG/ML
0.5 INJECTION INTRAMUSCULAR ONCE
Status: COMPLETED | OUTPATIENT
Start: 2023-03-26 | End: 2023-03-26

## 2023-03-26 RX ADMIN — CEFTRIAXONE 2000 MG: 10 INJECTION, POWDER, FOR SOLUTION INTRAVENOUS at 22:59

## 2023-03-26 RX ADMIN — POLYETHYLENE GLYCOL 3350 17 G: 17 POWDER, FOR SOLUTION ORAL at 08:02

## 2023-03-26 RX ADMIN — HYDROMORPHONE HYDROCHLORIDE 0.5 MG: 1 INJECTION, SOLUTION INTRAMUSCULAR; INTRAVENOUS; SUBCUTANEOUS at 16:04

## 2023-03-26 RX ADMIN — GADOBUTROL 9 ML: 604.72 INJECTION INTRAVENOUS at 15:39

## 2023-03-26 RX ADMIN — BACLOFEN 10 MG: 10 TABLET ORAL at 08:02

## 2023-03-26 RX ADMIN — OXYCODONE HYDROCHLORIDE 10 MG: 10 TABLET ORAL at 21:40

## 2023-03-26 RX ADMIN — OXYCODONE HYDROCHLORIDE 10 MG: 10 TABLET ORAL at 03:44

## 2023-03-26 RX ADMIN — DOCUSATE SODIUM 100 MG: 100 CAPSULE, LIQUID FILLED ORAL at 17:18

## 2023-03-26 RX ADMIN — BACLOFEN 10 MG: 10 TABLET ORAL at 20:24

## 2023-03-26 RX ADMIN — HYDROMORPHONE HYDROCHLORIDE 0.5 MG: 1 INJECTION, SOLUTION INTRAMUSCULAR; INTRAVENOUS; SUBCUTANEOUS at 23:56

## 2023-03-26 RX ADMIN — LORAZEPAM 0.5 MG: 2 INJECTION INTRAMUSCULAR; INTRAVENOUS at 14:53

## 2023-03-26 RX ADMIN — FLUCONAZOLE 100 MG: 100 TABLET ORAL at 08:02

## 2023-03-26 RX ADMIN — LORAZEPAM 1 MG: 1 TABLET ORAL at 00:03

## 2023-03-26 RX ADMIN — PREDNISONE 60 MG: 20 TABLET ORAL at 08:02

## 2023-03-26 RX ADMIN — HYDROXYCHLOROQUINE SULFATE 200 MG: 200 TABLET ORAL at 17:17

## 2023-03-26 RX ADMIN — GABAPENTIN 300 MG: 300 CAPSULE ORAL at 08:02

## 2023-03-26 RX ADMIN — BACLOFEN 10 MG: 10 TABLET ORAL at 16:04

## 2023-03-26 RX ADMIN — ENOXAPARIN SODIUM 40 MG: 100 INJECTION SUBCUTANEOUS at 08:02

## 2023-03-26 RX ADMIN — DOCUSATE SODIUM 100 MG: 100 CAPSULE, LIQUID FILLED ORAL at 08:02

## 2023-03-26 RX ADMIN — OXYCODONE HYDROCHLORIDE 5 MG: 5 TABLET ORAL at 08:13

## 2023-03-26 RX ADMIN — OXYCODONE HYDROCHLORIDE 10 MG: 10 TABLET ORAL at 13:54

## 2023-03-26 RX ADMIN — DIAZEPAM 5 MG: 5 TABLET ORAL at 13:57

## 2023-03-26 RX ADMIN — HYDROXYCHLOROQUINE SULFATE 200 MG: 200 TABLET ORAL at 08:03

## 2023-03-26 RX ADMIN — HYDROMORPHONE HYDROCHLORIDE 0.5 MG: 1 INJECTION, SOLUTION INTRAMUSCULAR; INTRAVENOUS; SUBCUTANEOUS at 11:11

## 2023-03-26 NOTE — PLAN OF CARE
Problem: Potential for Falls  Goal: Patient will remain free of falls  Description: INTERVENTIONS:  - Educate patient/family on patient safety including physical limitations  - Instruct patient to call for assistance with activity   - Consult OT/PT to assist with strengthening/mobility   - Keep Call bell within reach  - Keep bed low and locked with side rails adjusted as appropriate  - Keep care items and personal belongings within reach  - Initiate and maintain comfort rounds  - Make Fall Risk Sign visible to staff  - Apply yellow socks and bracelet for high fall risk patients  - Consider moving patient to room near nurses station  Outcome: Progressing     Problem: PAIN - ADULT  Goal: Verbalizes/displays adequate comfort level or baseline comfort level  Description: Interventions:  - Encourage patient to monitor pain and request assistance  - Assess pain using appropriate pain scale  - Administer analgesics based on type and severity of pain and evaluate response  - Implement non-pharmacological measures as appropriate and evaluate response  - Consider cultural and social influences on pain and pain management  - Notify physician/advanced practitioner if interventions unsuccessful or patient reports new pain  Outcome: Progressing     Problem: INFECTION - ADULT  Goal: Absence or prevention of progression during hospitalization  Description: INTERVENTIONS:  - Assess and monitor for signs and symptoms of infection  - Monitor lab/diagnostic results  - Monitor all insertion sites, i e  indwelling lines, tubes, and drains  - Monitor endotracheal if appropriate and nasal secretions for changes in amount and color  - [place] appropriate cooling/warming therapies per order  - Administer medications as ordered  - Instruct and encourage patient and family to use good hand hygiene technique  - Identify and instruct in appropriate isolation precautions for identified infection/condition  Outcome: Progressing     Problem: SAFETY ADULT  Goal: Maintain or return to baseline ADL function  Description: INTERVENTIONS:  -  Assess patient's ability to carry out ADLs; assess patient's baseline for ADL function and identify physical deficits which impact ability to perform ADLs (bathing, care of mouth/teeth, toileting, grooming, dressing, etc )  - Assess/evaluate cause of self-care deficits   - Assess range of motion  - Assess patient's mobility; develop plan if impaired  - Assess patient's need for assistive devices and provide as appropriate  - Encourage maximum independence but intervene and supervise when necessary  - Involve family in performance of ADLs  - Assess for home care needs following discharge   - Consider OT consult to assist with ADL evaluation and planning for discharge  - Provide patient education as appropriate  Outcome: Progressing     Problem: GENITOURINARY - ADULT  Goal: Maintains or returns to baseline urinary function  Description: INTERVENTIONS:  - Assess urinary function  - Encourage oral fluids to ensure adequate hydration if ordered  - Administer IV fluids as ordered to ensure adequate hydration  - Administer ordered medications as needed  - Offer frequent toileting  - Follow urinary retention protocol if ordered  Outcome: Progressing     Problem: SKIN/TISSUE INTEGRITY - ADULT  Goal: Skin Integrity remains intact(Skin Breakdown Prevention)  Description: Assess:  -Assess extremities for adequate circulation and sensation     Bed Management:  -Have minimal linens on bed & keep smooth, unwrinkled  -Change linens as needed when moist or perspiring      Toileting:  -Offer bedside commode    Outcome: Progressing     Problem: MUSCULOSKELETAL - ADULT  Goal: Maintain or return mobility to safest level of function  Description: INTERVENTIONS:  - Assess patient's ability to carry out ADLs; assess patient's baseline for ADL function and identify physical deficits which impact ability to perform ADLs (bathing, care of mouth/teeth, toileting, grooming, dressing, etc )  - Assess/evaluate cause of self-care deficits   - Assess range of motion  - Assess patient's mobility  - Assess patient's need for assistive devices and provide as appropriate  - Encourage maximum independence but intervene and supervise when necessary  - Involve family in performance of ADLs  - Assess for home care needs following discharge   - Consider OT consult to assist with ADL evaluation and planning for discharge  - Provide patient education as appropriate  Outcome: Progressing  Goal: Maintain proper alignment of affected body part  Description: INTERVENTIONS:  - Support, maintain and protect limb and body alignment  - Provide patient/ family with appropriate education  Outcome: Progressing     Problem: MOBILITY - ADULT  Goal: Maintain or return to baseline ADL function  Description: INTERVENTIONS:  -  Assess patient's ability to carry out ADLs; assess patient's baseline for ADL function and identify physical deficits which impact ability to perform ADLs (bathing, care of mouth/teeth, toileting, grooming, dressing, etc )  - Assess/evaluate cause of self-care deficits   - Assess range of motion  - Assess patient's mobility; develop plan if impaired  - Assess patient's need for assistive devices and provide as appropriate  - Encourage maximum independence but intervene and supervise when necessary  - Involve family in performance of ADLs  - Assess for home care needs following discharge   - Consider OT consult to assist with ADL evaluation and planning for discharge  - Provide patient education as appropriate  Outcome: Progressing  Goal: Maintains/Returns to pre admission functional level  Description: INTERVENTIONS:  - Perform BMAT or MOVE assessment daily    - Set and communicate daily mobility goal to care team and patient/family/caregiver     - Collaborate with rehabilitation services on mobility goals if consulted  - Out of bed for toileting  - Record patient progress and toleration of activity level   Outcome: Progressing

## 2023-03-26 NOTE — PROGRESS NOTES
[address] [place] [address]  Progress Note  Name: Kajal Caraballo  MRN: [id number]  Unit/Bed#: [place] 2 [facility] 87 212-02 I Date of Admission: 3/22/2023   Date of Service: 3/26/2023 I Hospital Day: 3    Assessment/Plan   * Intractable low back pain  Assessment & Plan  Started about 3 years ago  Treated with steroid injection and that lasted for about 2 years and 6 months  Now with severe back pain  She has been seen several times at [facility]   She is also beginning consultation down at a spinal [place] in [place]    But is having severe pain  She was seen by ortho, but nothing to do from their end other than pain control  No leg weakness, no loss of bowel or bladder control, and no saddle anesthesia  So no neurosurgical emergency  Check MRI of lumbar spine  She is a little better with prednisone, muscle relaxers and oxycodone  It likely will just take time for acute inflammation to go down  When she is walking with tolerable pain, she can go home  She can either follow up at [facility] or spine surgery in [place]  Acute cystitis with hematuria  Assessment & Plan  · Start empirically on Rocephin    Due to E  Coli               Subjective:   Still having back pain  Got down to 3/10 last night  Now back up to 6/10  It is the entire low back  No leg weakness      Objective:     Vitals:   Temp (24hrs), Av 6 °F (36 4 °C), Min:97 5 °F (36 4 °C), Max:97 7 °F (36 5 °C)    Temp:  [97 5 °F (36 4 °C)-97 7 °F (36 5 °C)] 97 6 °F (36 4 °C)  HR:  [66-79] 66  Resp:  [16-18] 16  BP: (100-111)/(62-69) 101/69  SpO2:  [93 %-96 %] 96 %  There is no height or weight on file to calculate BMI  Input and Output Summary (last 24 hours): Intake/Output Summary (Last 24 hours) at 3/26/2023 1124  Last data filed at 3/26/2023 0900  Gross per 24 hour   Intake 360 ml   Output --   Net 360 ml       Physical Exam:     Physical Exam  Vitals and nursing note reviewed  HENT:      Head: Normocephalic and atraumatic  Eyes:      Pupils: Pupils are equal, round, and reactive to light  Cardiovascular:      Rate and Rhythm: Normal rate and regular rhythm  Heart sounds: No murmur heard  No friction rub  No gallop  Pulmonary:      Effort: Pulmonary effort is normal       Breath sounds: Normal breath sounds  No wheezing or rales  Abdominal:      General: Bowel sounds are normal       Palpations: Abdomen is soft  Tenderness: There is no abdominal tenderness  Musculoskeletal:      Right lower leg: No edema  Left lower leg: No edema                 Additional Data:     Labs:    Results from last 7 days   Lab Units 03/23/23  0522   WBC Thousand/uL 9 39   HEMOGLOBIN g/dL 12 5   HEMATOCRIT % 38 4   PLATELETS Thousands/uL 191   NEUTROS PCT % 69   LYMPHS PCT % 21   MONOS PCT % 8   EOS PCT % 1     Results from last 7 days   Lab Units 03/23/23  0522   POTASSIUM mmol/L 4 4   CHLORIDE mmol/L 97   CO2 mmol/L 30   BUN mg/dL 25   CREATININE mg/dL 0 86   CALCIUM mg/dL 10 2                       * I Have Reviewed All Lab Data     Recent Cultures (last 7 days):     Results from last 7 days   Lab Units 03/22/23  1722   URINE CULTURE  >100,000 cfu/ml Escherichia coli*         Last 24 Hours Medication List:   Current Facility-Administered Medications   Medication Dose Route Frequency Provider Last Rate   • acetaminophen  650 mg Oral Q6H PRN Becky Garcia PA-C     • aluminum-magnesium hydroxide-simethicone  30 mL Oral Q6H PRN Becky Garcia PA-C     • baclofen  10 mg Oral TID Becky Garcia PA-C     • bisacodyl  10 mg Oral Daily PRN Marlene Patel DO     • cefTRIAXone  2,000 mg Intravenous Q24H Becky Garcia PA-C 2,000 mg (03/25/23 2138)   • diazepam  5 mg Oral Q8H PRN Becky Garcia PA-C     • docusate sodium  100 mg Oral BID Becky Garcia PA-C     • enoxaparin  40 mg Subcutaneous Daily Becky Garcia PA-C     • fluconazole  100 mg Oral Daily Ayala Grooms Fatemeh Dyson PA-C     • gabapentin  300 mg Oral Daily Ruth Bright PA-C     • HYDROmorphone  0 5 mg Intravenous Q2H PRN Ruth Bright PA-C     • hydroxychloroquine  200 mg Oral BID Ruth Bright PA-C     • lisinopril  10 mg Oral Daily Ruth Bright PA-C     • ondansetron  4 mg Intravenous Q6H PRN Ruth Bright PA-C     • oxyCODONE  10 mg Oral Q4H PRN Tammi Jumper Prechtel, DO     • oxyCODONE  5 mg Oral Q4H PRN Tammi Jumper Prechtel, DO     • polyethylene glycol  17 g Oral Daily Dominic S Prechtel, DO     • predniSONE  60 mg Oral Daily Dominic S Prechtel, DO           VTE Pharmacologic Prophylaxis:   Pharmacologic: Enoxaparin (Lovenox)      Current Length of Stay: 3 day(s)    Current Patient Status: Inpatient       Discharge Plan: home when back pain is more tolerable  Awaiting MRI lumbar spine  Code Status: Level 1 - Full Code           Today, Patient Was Seen By: Annalise Bishop DO    ** Please Note: Dictation voice to text software may have been used in the creation of this document   **

## 2023-03-26 NOTE — ASSESSMENT & PLAN NOTE
Started about 3 years ago  Treated with steroid injection and that lasted for about 2 years and 6 months  Now with severe back pain  She has been seen several times at [facility]   She is also beginning consultation down at a spinal [place] in [place]    But is having severe pain  She was seen by ortho, but nothing to do from their end other than pain control  No leg weakness, no loss of bowel or bladder control, and no saddle anesthesia  So no neurosurgical emergency  Check MRI of lumbar spine  She is a little better with prednisone, muscle relaxers and oxycodone  It likely will just take time for acute inflammation to go down  When she is walking with tolerable pain, she can go home  She can either follow up at [facility] or spine surgery in [place]

## 2023-03-27 PROBLEM — K59.00 CONSTIPATION: Status: ACTIVE | Noted: 2023-03-27

## 2023-03-27 LAB
ANION GAP SERPL CALCULATED.3IONS-SCNC: 5 MMOL/L (ref 4–13)
BASOPHILS # BLD AUTO: 0.01 THOUSANDS/ÂΜL (ref 0–0.1)
BASOPHILS NFR BLD AUTO: 0 % (ref 0–1)
BUN SERPL-MCNC: 21 MG/DL (ref 5–25)
CALCIUM SERPL-MCNC: 10 MG/DL (ref 8.4–10.2)
CHLORIDE SERPL-SCNC: 101 MMOL/L (ref 96–108)
CO2 SERPL-SCNC: 32 MMOL/L (ref 21–32)
CREAT SERPL-MCNC: 0.9 MG/DL (ref 0.6–1.3)
EOSINOPHIL # BLD AUTO: 0.01 THOUSAND/ÂΜL (ref 0–0.61)
EOSINOPHIL NFR BLD AUTO: 0 % (ref 0–6)
ERYTHROCYTE [DISTWIDTH] IN BLOOD BY AUTOMATED COUNT: 12 % (ref 11.6–15.1)
GFR SERPL CREATININE-BSD FRML MDRD: 72 ML/MIN/1.73SQ M
GLUCOSE SERPL-MCNC: 103 MG/DL (ref 65–140)
HCT VFR BLD AUTO: 39.3 % (ref 34.8–46.1)
HGB BLD-MCNC: 12.7 G/DL (ref 11.5–15.4)
IMM GRANULOCYTES # BLD AUTO: 0.04 THOUSAND/UL (ref 0–0.2)
IMM GRANULOCYTES NFR BLD AUTO: 1 % (ref 0–2)
LYMPHOCYTES # BLD AUTO: 2.31 THOUSANDS/ÂΜL (ref 0.6–4.47)
LYMPHOCYTES NFR BLD AUTO: 31 % (ref 14–44)
MAGNESIUM SERPL-MCNC: 2.1 MG/DL (ref 1.9–2.7)
MCH RBC QN AUTO: 31.1 PG (ref 26.8–34.3)
MCHC RBC AUTO-ENTMCNC: 32.3 G/DL (ref 31.4–37.4)
MCV RBC AUTO: 96 FL (ref 82–98)
MONOCYTES # BLD AUTO: 0.51 THOUSAND/ÂΜL (ref 0.17–1.22)
MONOCYTES NFR BLD AUTO: 7 % (ref 4–12)
NEUTROPHILS # BLD AUTO: 4.48 THOUSANDS/ÂΜL (ref 1.85–7.62)
NEUTS SEG NFR BLD AUTO: 61 % (ref 43–75)
NRBC BLD AUTO-RTO: 0 /100 WBCS
PLATELET # BLD AUTO: 217 THOUSANDS/UL (ref 149–390)
PMV BLD AUTO: 9.2 FL (ref 8.9–12.7)
POTASSIUM SERPL-SCNC: 5.4 MMOL/L (ref 3.5–5.3)
RBC # BLD AUTO: 4.08 MILLION/UL (ref 3.81–5.12)
SODIUM SERPL-SCNC: 138 MMOL/L (ref 135–147)
WBC # BLD AUTO: 7.36 THOUSAND/UL (ref 4.31–10.16)

## 2023-03-27 RX ORDER — SENNOSIDES 8.6 MG
2 TABLET ORAL 2 TIMES DAILY
Status: DISCONTINUED | OUTPATIENT
Start: 2023-03-27 | End: 2023-03-28 | Stop reason: HOSPADM

## 2023-03-27 RX ORDER — POLYETHYLENE GLYCOL 3350 17 G/17G
17 POWDER, FOR SOLUTION ORAL 2 TIMES DAILY
Status: DISCONTINUED | OUTPATIENT
Start: 2023-03-27 | End: 2023-03-28 | Stop reason: HOSPADM

## 2023-03-27 RX ORDER — HYDROMORPHONE HCL/PF 1 MG/ML
0.5 SYRINGE (ML) INJECTION EVERY 4 HOURS PRN
Status: DISCONTINUED | OUTPATIENT
Start: 2023-03-27 | End: 2023-03-28 | Stop reason: HOSPADM

## 2023-03-27 RX ADMIN — DIAZEPAM 5 MG: 5 TABLET ORAL at 18:08

## 2023-03-27 RX ADMIN — OXYCODONE HYDROCHLORIDE 5 MG: 5 TABLET ORAL at 08:19

## 2023-03-27 RX ADMIN — FLUCONAZOLE 100 MG: 100 TABLET ORAL at 08:19

## 2023-03-27 RX ADMIN — SENNOSIDES 17.2 MG: 8.6 TABLET, FILM COATED ORAL at 18:09

## 2023-03-27 RX ADMIN — HYDROMORPHONE HYDROCHLORIDE 0.5 MG: 1 INJECTION, SOLUTION INTRAMUSCULAR; INTRAVENOUS; SUBCUTANEOUS at 05:28

## 2023-03-27 RX ADMIN — HYDROMORPHONE HYDROCHLORIDE 0.5 MG: 1 INJECTION, SOLUTION INTRAMUSCULAR; INTRAVENOUS; SUBCUTANEOUS at 10:52

## 2023-03-27 RX ADMIN — HYDROXYCHLOROQUINE SULFATE 200 MG: 200 TABLET ORAL at 08:19

## 2023-03-27 RX ADMIN — GABAPENTIN 300 MG: 300 CAPSULE ORAL at 08:19

## 2023-03-27 RX ADMIN — DOCUSATE SODIUM 100 MG: 100 CAPSULE, LIQUID FILLED ORAL at 18:08

## 2023-03-27 RX ADMIN — HYDROMORPHONE HYDROCHLORIDE 0.5 MG: 1 INJECTION, SOLUTION INTRAMUSCULAR; INTRAVENOUS; SUBCUTANEOUS at 14:55

## 2023-03-27 RX ADMIN — ENOXAPARIN SODIUM 40 MG: 100 INJECTION SUBCUTANEOUS at 08:18

## 2023-03-27 RX ADMIN — POLYETHYLENE GLYCOL 3350 17 G: 17 POWDER, FOR SOLUTION ORAL at 18:08

## 2023-03-27 RX ADMIN — OXYCODONE HYDROCHLORIDE 10 MG: 10 TABLET ORAL at 12:30

## 2023-03-27 RX ADMIN — OXYCODONE HYDROCHLORIDE 10 MG: 10 TABLET ORAL at 20:53

## 2023-03-27 RX ADMIN — PREDNISONE 60 MG: 20 TABLET ORAL at 08:19

## 2023-03-27 RX ADMIN — BACLOFEN 10 MG: 10 TABLET ORAL at 08:19

## 2023-03-27 RX ADMIN — METHYLNALTREXONE BROMIDE 12 MG: 12 INJECTION, SOLUTION SUBCUTANEOUS at 10:48

## 2023-03-27 RX ADMIN — POLYETHYLENE GLYCOL 3350 17 G: 17 POWDER, FOR SOLUTION ORAL at 08:19

## 2023-03-27 RX ADMIN — BISACODYL 10 MG: 5 TABLET, COATED ORAL at 08:19

## 2023-03-27 RX ADMIN — LISINOPRIL 10 MG: 10 TABLET ORAL at 08:19

## 2023-03-27 RX ADMIN — SENNOSIDES 17.2 MG: 8.6 TABLET, FILM COATED ORAL at 10:48

## 2023-03-27 RX ADMIN — HYDROXYCHLOROQUINE SULFATE 200 MG: 200 TABLET ORAL at 18:09

## 2023-03-27 RX ADMIN — BACLOFEN 10 MG: 10 TABLET ORAL at 20:53

## 2023-03-27 RX ADMIN — DOCUSATE SODIUM 100 MG: 100 CAPSULE, LIQUID FILLED ORAL at 08:19

## 2023-03-27 RX ADMIN — BACLOFEN 10 MG: 10 TABLET ORAL at 18:08

## 2023-03-27 RX ADMIN — DIAZEPAM 5 MG: 5 TABLET ORAL at 08:18

## 2023-03-27 NOTE — PROGRESS NOTES
[address] [place] [address]  Progress Note  Name: Harper Pickering  MRN: [id number]  Unit/Bed#: [address] [address] 2 [address] 87 [id number] I Date of Admission: 3/22/2023   Date of Service: 3/27/2023 I Hospital Day: 4    Assessment/Plan   * Intractable low back pain  Assessment & Plan  Acute pain started in February   She has been seen several times at [facility]  Several courses of PRN medications and steroid tapers   She is persuing consultation down at a spinal [place] in [place]  She was seen by ortho, but nothing to do from their end other than pain control    No leg weakness, no loss of bowel or bladder control, and no saddle anesthesia  MRI with disc bulges and foraminal narrowing  Continue prednisone, prn oxycodone, muscle relaxer, ice/heat   Dc home when can safely ambulation      Constipation  Assessment & Plan  Opoid induced   Will give dose of relistor   Continue bowel regimen     Thrush, oral  Assessment & Plan  · Patient reports she recently started on fluconazole once daily for issue   · She states she has been on two long tapers of steroids for her back pain, likely the cause   · Continue fluconazole     Chronic, continuous use of opioids  Assessment & Plan  · PDMP reviewed   · Continue home regimen of 5mg valium and 5mg oxy  · Continue pain management     Acute cystitis with hematuria  Assessment & Plan  Completed course of IV rocephin     Hypertension  Assessment & Plan  · Hold zestril given borderline hyperkalemia        VTE Pharmacologic Prophylaxis:     Patient Centered Rounds:  Patient care rounds were performed with nursing    Discussions with Specialists or Other Care Team Provider:     Education and Discussions with Family / Patient:     Time Spent for Care: 30  More than 50% of total time spent on counseling and coordination of care as described above      Current Length of Stay: 4 day(s)    Current Patient Status: Inpatient   Certification Statement: The patient will continue to require additional inpatient hospital stay due to    Discharge Plan:     Code Status: Level 1 - Full Code      Subjective:       Objective:     Vitals:   Temp (24hrs), Av 9 °F (36 6 °C), Min:97 4 °F (36 3 °C), Max:98 8 °F (37 1 °C)    Temp:  [97 4 °F (36 3 °C)-98 8 °F (37 1 °C)] 98 8 °F (37 1 °C)  HR:  [71-86] 86  Resp:  [18-20] 20  BP: (121-134)/(74-90) 125/79  SpO2:  [93 %-96 %] 96 %  Body mass index is 36 25 kg/m²  Input and Output Summary (last 24 hours): Intake/Output Summary (Last 24 hours) at 3/27/2023 1024  Last data filed at 3/27/2023 0800  Gross per 24 hour   Intake 1320 ml   Output --   Net 1320 ml       Physical Exam:     Physical Exam  Vitals reviewed  Constitutional:       General: She is not in acute distress  Appearance: She is well-developed  She is not ill-appearing, toxic-appearing or diaphoretic  HENT:      Head: Normocephalic and atraumatic  Mouth/Throat:      Mouth: Mucous membranes are moist    Eyes:      General: No scleral icterus  Extraocular Movements: Extraocular movements intact  Cardiovascular:      Rate and Rhythm: Normal rate and regular rhythm  Heart sounds: Normal heart sounds  Pulmonary:      Effort: Pulmonary effort is normal  No respiratory distress  Breath sounds: Normal breath sounds  No wheezing or rales  Abdominal:      General: There is no distension  Palpations: Abdomen is soft  Tenderness: There is no abdominal tenderness  There is no guarding or rebound  Musculoskeletal:         General: No swelling, tenderness or deformity  Comments: Tight paraspinal lumbar msk   Skin:     General: Skin is warm and dry  Neurological:      General: No focal deficit present  Mental Status: She is alert  Mental status is at baseline  Psychiatric:         Mood and Affect: Mood normal          Behavior: Behavior normal          Thought Content:  Thought content normal          Judgment: Judgment normal          Additional Data:     Labs:  I have reviewed pertinent results     Results from last 7 days   Lab Units 03/27/23  0526   WBC Thousand/uL 7 36   HEMOGLOBIN g/dL 12 7   HEMATOCRIT % 39 3   PLATELETS Thousands/uL 217   NEUTROS PCT % 61   LYMPHS PCT % 31   MONOS PCT % 7   EOS PCT % 0     Results from last 7 days   Lab Units 03/27/23  0526   SODIUM mmol/L 138   POTASSIUM mmol/L 5 4*   CHLORIDE mmol/L 101   CO2 mmol/L 32   BUN mg/dL 21   CREATININE mg/dL 0 90   ANION GAP mmol/L 5   CALCIUM mg/dL 10 0   GLUCOSE RANDOM mg/dL 103                         Imaging: I have reviewed pertinent imaging       Recent Cultures (last 7 days):     Results from last 7 days   Lab Units 03/22/23  1722   URINE CULTURE  >100,000 cfu/ml Escherichia coli*       Last 24 Hours Medication List:   Current Facility-Administered Medications   Medication Dose Route Frequency Provider Last Rate   • acetaminophen  650 mg Oral Q6H PRN [place] DAHLIA Alston     • aluminum-magnesium hydroxide-simethicone  30 mL Oral Q6H PRN [place] DAHLIA Alston     • baclofen  10 mg Oral TID [place] DAHLIA Alston     • bisacodyl  10 mg Oral Daily PRN Zeke Rosanne Prechtel, DO     • diazepam  5 mg Oral Q8H PRN Malia DAHLIA Alston     • docusate sodium  100 mg Oral BID Malia DAHLIA Alston     • enoxaparin  40 mg Subcutaneous Daily Malia DAHLIA Alston     • fluconazole  100 mg Oral Daily Malia DAHLIA Alston     • gabapentin  300 mg Oral Daily [place] DAHLIA Alston     • HYDROmorphone  0 5 mg Intravenous Q2H PRN Malia DAHLIA Alston     • hydroxychloroquine  200 mg Oral BID [place] DAHLIA Alston     • ondansetron  4 mg Intravenous Q6H PRN [place] DAHLIA Alston     • oxyCODONE  10 mg Oral Q4H PRN Zeke Rosanne Prechtel, DO     • oxyCODONE  5 mg Oral Q4H PRN Zeke Rosanne Prechtel, DO     • polyethylene glycol  17 g Oral Daily Dominic S Prechtel, DO     • predniSONE  60 mg Oral Daily Pranav Michelle DO          Today, Patient Was Seen By: Waleska Leon DO    ** Please Note: Dictation voice to text software may have been used in the creation of this document   **

## 2023-03-27 NOTE — PLAN OF CARE
Problem: Potential for Falls  Goal: Patient will remain free of falls  Description: INTERVENTIONS:  - Educate patient/family on patient safety including physical limitations  - Instruct patient to call for assistance with activity   - Consult OT/PT to assist with strengthening/mobility   - Keep Call bell within reach  - Keep bed low and locked with side rails adjusted as appropriate  - Keep care items and personal belongings within reach  - Initiate and maintain comfort rounds  - Make Fall Risk Sign visible to staff  - Offer Toileting every  Hours, in advance of need  - Initiate/Maintain alarm  - Obtain necessary fall risk management equipment:   - Apply yellow socks and bracelet for high fall risk patients  - Consider moving patient to room near nurses station  Outcome: Progressing     Problem: PAIN - ADULT  Goal: Verbalizes/displays adequate comfort level or baseline comfort level  Description: Interventions:  - Encourage patient to monitor pain and request assistance  - Assess pain using appropriate pain scale  - Administer analgesics based on type and severity of pain and evaluate response  - Implement non-pharmacological measures as appropriate and evaluate response  - Consider cultural and social influences on pain and pain management  - Notify physician/advanced practitioner if interventions unsuccessful or patient reports new pain  Outcome: Progressing     Problem: INFECTION - ADULT  Goal: Absence or prevention of progression during hospitalization  Description: INTERVENTIONS:  - Assess and monitor for signs and symptoms of infection  - Monitor lab/diagnostic results  - Monitor all insertion sites, i e  indwelling lines, tubes, and drains  - Monitor endotracheal if appropriate and nasal secretions for changes in amount and color  - [place] appropriate cooling/warming therapies per order  - Administer medications as ordered  - Instruct and encourage patient and family to use good hand hygiene technique  - Identify and instruct in appropriate isolation precautions for identified infection/condition  Outcome: Progressing     Problem: SAFETY ADULT  Goal: Maintain or return to baseline ADL function  Description: INTERVENTIONS:  -  Assess patient's ability to carry out ADLs; assess patient's baseline for ADL function and identify physical deficits which impact ability to perform ADLs (bathing, care of mouth/teeth, toileting, grooming, dressing, etc )  - Assess/evaluate cause of self-care deficits   - Assess range of motion  - Assess patient's mobility; develop plan if impaired  - Assess patient's need for assistive devices and provide as appropriate  - Encourage maximum independence but intervene and supervise when necessary  - Involve family in performance of ADLs  - Assess for home care needs following discharge   - Consider OT consult to assist with ADL evaluation and planning for discharge  - Provide patient education as appropriate  Outcome: Progressing     Problem: GENITOURINARY - ADULT  Goal: Maintains or returns to baseline urinary function  Description: INTERVENTIONS:  - Assess urinary function  - Encourage oral fluids to ensure adequate hydration if ordered  - Administer IV fluids as ordered to ensure adequate hydration  - Administer ordered medications as needed  - Offer frequent toileting  - Follow urinary retention protocol if ordered  Outcome: Progressing     Problem: SKIN/TISSUE INTEGRITY - ADULT  Goal: Skin Integrity remains intact(Skin Breakdown Prevention)  Description: Assess:  -Perform Deju[place] assessment every   -Clean and moisturize skin every   -Inspect skin when repositioning, toileting, and assisting with ADLS  -Assess under medical devices such as  every   -Assess extremities for adequate circulation and sensation     Bed Management:  -Have minimal linens on bed & keep smooth, unwrinkled  -Change linens as needed when moist or perspiring  -Avoid sitting or lying in one position for more than  hours while in bed  -Keep HOB at degrees     Toileting:  -Offer bedside commode  -Assess for incontinence every   -Use incontinent care products after each incontinent episode such as     Activity:  -Mobilize patient  times a day  -Encourage activity and walks on unit  -Encourage or provide ROM exercises   -Turn and reposition patient every  Hours  -Use appropriate equipment to lift or move patient in bed  -Instruct/ Assist with weight shifting every  when out of bed in chair  -Consider limitation of chair time  hour intervals    Skin Care:  -Avoid use of baby powder, tape, friction and shearing, hot water or constrictive clothing  -Relieve pressure over bony prominences using   -Do not massage red bony areas    Next Steps:  -Teach patient strategies to minimize risks such as    -Consider consults to  interdisciplinary teams such as   Outcome: Progressing     Problem: MUSCULOSKELETAL - ADULT  Goal: Maintain or return mobility to safest level of function  Description: INTERVENTIONS:  - Assess patient's ability to carry out ADLs; assess patient's baseline for ADL function and identify physical deficits which impact ability to perform ADLs (bathing, care of mouth/teeth, toileting, grooming, dressing, etc )  - Assess/evaluate cause of self-care deficits   - Assess range of motion  - Assess patient's mobility  - Assess patient's need for assistive devices and provide as appropriate  - Encourage maximum independence but intervene and supervise when necessary  - Involve family in performance of ADLs  - Assess for home care needs following discharge   - Consider OT consult to assist with ADL evaluation and planning for discharge  - Provide patient education as appropriate  Outcome: Progressing  Goal: Maintain proper alignment of affected body part  Description: INTERVENTIONS:  - Support, maintain and protect limb and body alignment  - Provide patient/ family with appropriate education  Outcome: Progressing     Problem: MOBILITY - ADULT  Goal: Maintain or return to baseline ADL function  Description: INTERVENTIONS:  -  Assess patient's ability to carry out ADLs; assess patient's baseline for ADL function and identify physical deficits which impact ability to perform ADLs (bathing, care of mouth/teeth, toileting, grooming, dressing, etc )  - Assess/evaluate cause of self-care deficits   - Assess range of motion  - Assess patient's mobility; develop plan if impaired  - Assess patient's need for assistive devices and provide as appropriate  - Encourage maximum independence but intervene and supervise when necessary  - Involve family in performance of ADLs  - Assess for home care needs following discharge   - Consider OT consult to assist with ADL evaluation and planning for discharge  - Provide patient education as appropriate  Outcome: Progressing  Goal: Maintains/Returns to pre admission functional level  Description: INTERVENTIONS:  - Perform BMAT or MOVE assessment daily    - Set and communicate daily mobility goal to care team and patient/family/caregiver  - Collaborate with rehabilitation services on mobility goals if consulted  - Perform Range of Motion  times a day  - Reposition patient every  hours    - Dangle patient  times a day  - Stand patient  times a day  - Ambulate patient  times a day  - Out of bed to chair  times a day   - Out of bed for meals times a day  - Out of bed for toileting  - Record patient progress and toleration of activity level   Outcome: Progressing

## 2023-03-27 NOTE — PLAN OF CARE
Problem: Potential for Falls  Goal: Patient will remain free of falls  Description: INTERVENTIONS:  - Educate patient/family on patient safety including physical limitations  - Instruct patient to call for assistance with activity   - Consult OT/PT to assist with strengthening/mobility   - Keep Call bell within reach  - Keep bed low and locked with side rails adjusted as appropriate  - Keep care items and personal belongings within reach  - Initiate and maintain comfort rounds  - Make Fall Risk Sign visible to staff  - Offer Toileting every 2 Hours, in advance of need  - Initiate/Maintain alarm  - Obtain necessary fall risk management equipment  - Apply yellow socks and bracelet for high fall risk patients  - Consider moving patient to room near nurses station  Outcome: Progressing     Problem: PAIN - ADULT  Goal: Verbalizes/displays adequate comfort level or baseline comfort level  Description: Interventions:  - Encourage patient to monitor pain and request assistance  - Assess pain using appropriate pain scale  - Administer analgesics based on type and severity of pain and evaluate response  - Implement non-pharmacological measures as appropriate and evaluate response  - Consider cultural and social influences on pain and pain management  - Notify physician/advanced practitioner if interventions unsuccessful or patient reports new pain  Outcome: Progressing     Problem: INFECTION - ADULT  Goal: Absence or prevention of progression during hospitalization  Description: INTERVENTIONS:  - Assess and monitor for signs and symptoms of infection  - Monitor lab/diagnostic results  - Monitor all insertion sites, i e  indwelling lines, tubes, and drains  - Monitor endotracheal if appropriate and nasal secretions for changes in amount and color  - [place] appropriate cooling/warming therapies per order  - Administer medications as ordered  - Instruct and encourage patient and family to use good hand hygiene technique  - Identify and instruct in appropriate isolation precautions for identified infection/condition  Outcome: Progressing     Problem: SAFETY ADULT  Goal: Maintain or return to baseline ADL function  Description: INTERVENTIONS:  -  Assess patient's ability to carry out ADLs; assess patient's baseline for ADL function and identify physical deficits which impact ability to perform ADLs (bathing, care of mouth/teeth, toileting, grooming, dressing, etc )  - Assess/evaluate cause of self-care deficits   - Assess range of motion  - Assess patient's mobility; develop plan if impaired  - Assess patient's need for assistive devices and provide as appropriate  - Encourage maximum independence but intervene and supervise when necessary  - Involve family in performance of ADLs  - Assess for home care needs following discharge   - Consider OT consult to assist with ADL evaluation and planning for discharge  - Provide patient education as appropriate  Outcome: Progressing     Problem: GENITOURINARY - ADULT  Goal: Maintains or returns to baseline urinary function  Description: INTERVENTIONS:  - Assess urinary function  - Encourage oral fluids to ensure adequate hydration if ordered  - Administer IV fluids as ordered to ensure adequate hydration  - Administer ordered medications as needed  - Offer frequent toileting  - Follow urinary retention protocol if ordered  Outcome: Progressing     Problem: SKIN/TISSUE INTEGRITY - ADULT  Goal: Skin Integrity remains intact(Skin Breakdown Prevention)  Description: Assess:  -Perform Dejuan assessment  -Clean and moisturize skin  -Inspect skin when repositioning, toileting, and assisting with ADLS  -Assess under medical devices  -Assess extremities for adequate circulation and sensation     Bed Management:  -Have minimal linens on bed & keep smooth, unwrinkled  -Change linens as needed when moist or perspiring  -Avoid sitting or lying in one position for more than 2 hours while in bed  -Keep HOB at 45 degrees     Toileting:  -Offer bedside commode  -Assess for incontinence  -Use incontinent care products after each incontinent episode    Activity:  -Mobilize patient 3 times a day  -Encourage activity and walks on unit  -Encourage or provide ROM exercises   -Turn and reposition patient every 2 Hours  -Use appropriate equipment to lift or move patient in bed  -Instruct/ Assist with weight shifting every 2 when out of bed in chair  -Consider limitation of chair time 2 hour intervals    Skin Care:  -Avoid use of baby powder, tape, friction and shearing, hot water or constrictive clothing  -Relieve pressure over bony prominences  -Do not massage red bony areas    Next Steps:  -Teach patient strategies to minimize risks   -Consider consults to  interdisciplinary teams  Outcome: Progressing     Problem: MUSCULOSKELETAL - ADULT  Goal: Maintain or return mobility to safest level of function  Description: INTERVENTIONS:  - Assess patient's ability to carry out ADLs; assess patient's baseline for ADL function and identify physical deficits which impact ability to perform ADLs (bathing, care of mouth/teeth, toileting, grooming, dressing, etc )  - Assess/evaluate cause of self-care deficits   - Assess range of motion  - Assess patient's mobility  - Assess patient's need for assistive devices and provide as appropriate  - Encourage maximum independence but intervene and supervise when necessary  - Involve family in performance of ADLs  - Assess for home care needs following discharge   - Consider OT consult to assist with ADL evaluation and planning for discharge  - Provide patient education as appropriate  Outcome: Progressing  Goal: Maintain proper alignment of affected body part  Description: INTERVENTIONS:  - Support, maintain and protect limb and body alignment  - Provide patient/ family with appropriate education  Outcome: Progressing     Problem: MOBILITY - ADULT  Goal: Maintain or return to baseline ADL function  Description: INTERVENTIONS:  -  Assess patient's ability to carry out ADLs; assess patient's baseline for ADL function and identify physical deficits which impact ability to perform ADLs (bathing, care of mouth/teeth, toileting, grooming, dressing, etc )  - Assess/evaluate cause of self-care deficits   - Assess range of motion  - Assess patient's mobility; develop plan if impaired  - Assess patient's need for assistive devices and provide as appropriate  - Encourage maximum independence but intervene and supervise when necessary  - Involve family in performance of ADLs  - Assess for home care needs following discharge   - Consider OT consult to assist with ADL evaluation and planning for discharge  - Provide patient education as appropriate  Outcome: Progressing  Goal: Maintains/Returns to pre admission functional level  Description: INTERVENTIONS:  - Perform BMAT or MOVE assessment daily    - Set and communicate daily mobility goal to care team and patient/family/caregiver  - Collaborate with rehabilitation services on mobility goals if consulted  - Perform Range of Motion 3 times a day  - Reposition patient every 2 hours    - Dangle patient 3 times a day  - Stand patient 3 times a day  - Ambulate patient 3 times a day  - Out of bed to chair 3 times a day   - Out of bed for meals 3 times a day  - Out of bed for toileting  - Record patient progress and toleration of activity level   Outcome: Progressing

## 2023-03-27 NOTE — ASSESSMENT & PLAN NOTE
Acute pain started in February   She has been seen several times at [facility]  Several courses of PRN medications and steroid tapers   She is persuing consultation down at a spinal [place] in [place]  She was seen by ortho, but nothing to do from their end other than pain control    No leg weakness, no loss of bowel or bladder control, and no saddle anesthesia       MRI with disc bulges and foraminal narrowing  Continue prednisone, prn oxycodone, muscle relaxer, ice/heat   Dc home when can safely ambulation

## 2023-03-28 VITALS
WEIGHT: 211.2 LBS | SYSTOLIC BLOOD PRESSURE: 124 MMHG | RESPIRATION RATE: 16 BRPM | BODY MASS INDEX: 36.06 KG/M2 | TEMPERATURE: 97.9 F | OXYGEN SATURATION: 97 % | HEIGHT: 64 IN | HEART RATE: 77 BPM | DIASTOLIC BLOOD PRESSURE: 76 MMHG

## 2023-03-28 LAB
ANION GAP SERPL CALCULATED.3IONS-SCNC: 6 MMOL/L (ref 4–13)
BUN SERPL-MCNC: 17 MG/DL (ref 5–25)
CALCIUM SERPL-MCNC: 9.6 MG/DL (ref 8.4–10.2)
CHLORIDE SERPL-SCNC: 101 MMOL/L (ref 96–108)
CO2 SERPL-SCNC: 31 MMOL/L (ref 21–32)
CREAT SERPL-MCNC: 0.81 MG/DL (ref 0.6–1.3)
GFR SERPL CREATININE-BSD FRML MDRD: 82 ML/MIN/1.73SQ M
GLUCOSE SERPL-MCNC: 93 MG/DL (ref 65–140)
POTASSIUM SERPL-SCNC: 5 MMOL/L (ref 3.5–5.3)
SODIUM SERPL-SCNC: 138 MMOL/L (ref 135–147)

## 2023-03-28 RX ORDER — OXYCODONE HYDROCHLORIDE 5 MG/1
5 TABLET ORAL EVERY 6 HOURS PRN
Qty: 20 TABLET | Refills: 0 | Status: SHIPPED | OUTPATIENT
Start: 2023-03-28 | End: 2023-04-07

## 2023-03-28 RX ORDER — PREDNISONE 20 MG/1
TABLET ORAL
Qty: 10 TABLET | Refills: 0 | Status: SHIPPED | OUTPATIENT
Start: 2023-03-29 | End: 2023-04-06

## 2023-03-28 RX ORDER — POLYETHYLENE GLYCOL 3350 17 G/17G
17 POWDER, FOR SOLUTION ORAL DAILY
Qty: 30 EACH | Refills: 0 | Status: SHIPPED | OUTPATIENT
Start: 2023-03-28

## 2023-03-28 RX ORDER — HYDROCHLOROTHIAZIDE 12.5 MG/1
12.5 TABLET ORAL DAILY
Status: DISCONTINUED | OUTPATIENT
Start: 2023-03-28 | End: 2023-03-28 | Stop reason: HOSPADM

## 2023-03-28 RX ORDER — BACLOFEN 10 MG/1
10 TABLET ORAL 3 TIMES DAILY
Qty: 90 TABLET | Refills: 0 | Status: SHIPPED | OUTPATIENT
Start: 2023-03-28

## 2023-03-28 RX ADMIN — BACLOFEN 10 MG: 10 TABLET ORAL at 09:59

## 2023-03-28 RX ADMIN — SENNOSIDES 17.2 MG: 8.6 TABLET, FILM COATED ORAL at 09:58

## 2023-03-28 RX ADMIN — OXYCODONE HYDROCHLORIDE 10 MG: 10 TABLET ORAL at 01:01

## 2023-03-28 RX ADMIN — HYDROMORPHONE HYDROCHLORIDE 0.5 MG: 1 INJECTION, SOLUTION INTRAMUSCULAR; INTRAVENOUS; SUBCUTANEOUS at 02:16

## 2023-03-28 RX ADMIN — HYDROCHLOROTHIAZIDE 12.5 MG: 12.5 TABLET ORAL at 09:59

## 2023-03-28 RX ADMIN — GABAPENTIN 300 MG: 300 CAPSULE ORAL at 09:59

## 2023-03-28 RX ADMIN — FLUCONAZOLE 100 MG: 100 TABLET ORAL at 09:58

## 2023-03-28 RX ADMIN — DOCUSATE SODIUM 100 MG: 100 CAPSULE, LIQUID FILLED ORAL at 09:58

## 2023-03-28 RX ADMIN — DIAZEPAM 5 MG: 5 TABLET ORAL at 02:16

## 2023-03-28 RX ADMIN — OXYCODONE HYDROCHLORIDE 10 MG: 10 TABLET ORAL at 05:11

## 2023-03-28 RX ADMIN — HYDROXYCHLOROQUINE SULFATE 200 MG: 200 TABLET ORAL at 10:00

## 2023-03-28 RX ADMIN — POLYETHYLENE GLYCOL 3350 17 G: 17 POWDER, FOR SOLUTION ORAL at 09:57

## 2023-03-28 RX ADMIN — OXYCODONE HYDROCHLORIDE 10 MG: 10 TABLET ORAL at 09:59

## 2023-03-28 RX ADMIN — ENOXAPARIN SODIUM 40 MG: 100 INJECTION SUBCUTANEOUS at 09:57

## 2023-03-28 RX ADMIN — PREDNISONE 60 MG: 20 TABLET ORAL at 09:58

## 2023-03-28 NOTE — CASE MANAGEMENT
Case Management Discharge Planning Note    Patient name Greyson Chester  Location [facility] /[facility] 2 Althea Lewis* MRN [id number]  : 1965 Date 3/28/2023       Current Admission Date: 3/22/2023  Current Admission Diagnosis:Intractable low back pain   Patient Active Problem List    Diagnosis Date Noted   • Constipation 2023   • Intractable low back pain 2023   • Acute cystitis with hematuria 2023   • Chronic, continuous use of opioids 2023   • Thrush, oral 2023   • Hypertension 2021   • Total knee replacement status, bilateral 2021      LOS (days): 5  Geometric Mean LOS (GMLOS) (days):   Days to GMLOS:     OBJECTIVE:  Risk of Unplanned Readmission Score: 9 33         Current admission status: Inpatient   Preferred Pharmacy:   [address] [place] [id number]  Phone: [phone] Fax: [phone]    Primary Care Provider: Jalil Batista DO    Primary Insurance: BLUE CROSS  Secondary Insurance:     DISCHARGE DETAILS:       [address]         Is the patient interested in [address] at discharge?: Yes  [address] requested[de-identified] Physical [address] Name[de-identified] Other  [facility] Provider[de-identified] PCP  Home Health Services Needed[de-identified] Gait/ADL Training, Evaluate Functional Status and Safety  Homebound Criteria Met[de-identified] Requires Medical Transportation  Supporting Clincal Findings[de-identified] Limited Endurance         Other Referral/Resources/Interventions Provided:  Interventions: [facility]         Treatment Team Recommendation: Home with  [address]  Discharge Destination Plan[de-identified] Home with [place] at Discharge : Automobile

## 2023-03-28 NOTE — PLAN OF CARE
Problem: Potential for Falls  Goal: Patient will remain free of falls  Description: INTERVENTIONS:  - Educate patient/family on patient safety including physical limitations  - Instruct patient to call for assistance with activity   - Consult OT/PT to assist with strengthening/mobility   - Keep Call bell within reach  - Keep bed low and locked with side rails adjusted as appropriate  - Keep care items and personal belongings within reach  - Initiate and maintain comfort rounds  - Make Fall Risk Sign visible to staff  - Offer Toileting every  Hours, in advance of need  - Initiate/Maintain alarm  - Obtain necessary fall risk management equipment:   - Apply yellow socks and bracelet for high fall risk patients  - Consider moving patient to room near nurses station  Outcome: Progressing     Problem: PAIN - ADULT  Goal: Verbalizes/displays adequate comfort level or baseline comfort level  Description: Interventions:  - Encourage patient to monitor pain and request assistance  - Assess pain using appropriate pain scale  - Administer analgesics based on type and severity of pain and evaluate response  - Implement non-pharmacological measures as appropriate and evaluate response  - Consider cultural and social influences on pain and pain management  - Notify physician/advanced practitioner if interventions unsuccessful or patient reports new pain  Outcome: Progressing     Problem: INFECTION - ADULT  Goal: Absence or prevention of progression during hospitalization  Description: INTERVENTIONS:  - Assess and monitor for signs and symptoms of infection  - Monitor lab/diagnostic results  - Monitor all insertion sites, i e  indwelling lines, tubes, and drains  - Monitor endotracheal if appropriate and nasal secretions for changes in amount and color  - [place] appropriate cooling/warming therapies per order  - Administer medications as ordered  - Instruct and encourage patient and family to use good hand hygiene technique  - Identify and instruct in appropriate isolation precautions for identified infection/condition  Outcome: Progressing     Problem: SAFETY ADULT  Goal: Maintain or return to baseline ADL function  Description: INTERVENTIONS:  -  Assess patient's ability to carry out ADLs; assess patient's baseline for ADL function and identify physical deficits which impact ability to perform ADLs (bathing, care of mouth/teeth, toileting, grooming, dressing, etc )  - Assess/evaluate cause of self-care deficits   - Assess range of motion  - Assess patient's mobility; develop plan if impaired  - Assess patient's need for assistive devices and provide as appropriate  - Encourage maximum independence but intervene and supervise when necessary  - Involve family in performance of ADLs  - Assess for home care needs following discharge   - Consider OT consult to assist with ADL evaluation and planning for discharge  - Provide patient education as appropriate  Outcome: Progressing     Problem: GENITOURINARY - ADULT  Goal: Maintains or returns to baseline urinary function  Description: INTERVENTIONS:  - Assess urinary function  - Encourage oral fluids to ensure adequate hydration if ordered  - Administer IV fluids as ordered to ensure adequate hydration  - Administer ordered medications as needed  - Offer frequent toileting  - Follow urinary retention protocol if ordered  Outcome: Progressing     Problem: SKIN/TISSUE INTEGRITY - ADULT  Goal: Skin Integrity remains intact(Skin Breakdown Prevention)  Description: Assess:  -Perform Dejuan assessment every   -Clean and moisturize skin every   -Inspect skin when repositioning, toileting, and assisting with ADLS  -Assess under medical devices such as  every   -Assess extremities for adequate circulation and sensation     Bed Management:  -Have minimal linens on bed & keep smooth, unwrinkled  -Change linens as needed when moist or perspiring  -Avoid sitting or lying in one position for more than  hours while in bed  -Keep HOB at degrees     Toileting:  -Offer bedside commode  -Assess for incontinence every   -Use incontinent care products after each incontinent episode such as     Activity:  -Mobilize patient  times a day  -Encourage activity and walks on unit  -Encourage or provide ROM exercises   -Turn and reposition patient every  Hours  -Use appropriate equipment to lift or move patient in bed  -Instruct/ Assist with weight shifting every  when out of bed in chair  -Consider limitation of chair time  hour intervals    Skin Care:  -Avoid use of baby powder, tape, friction and shearing, hot water or constrictive clothing  -Relieve pressure over bony prominences using   -Do not massage red bony areas    Next Steps:  -Teach patient strategies to minimize risks such as    -Consider consults to  interdisciplinary teams such as   Outcome: Progressing     Problem: MUSCULOSKELETAL - ADULT  Goal: Maintain or return mobility to safest level of function  Description: INTERVENTIONS:  - Assess patient's ability to carry out ADLs; assess patient's baseline for ADL function and identify physical deficits which impact ability to perform ADLs (bathing, care of mouth/teeth, toileting, grooming, dressing, etc )  - Assess/evaluate cause of self-care deficits   - Assess range of motion  - Assess patient's mobility  - Assess patient's need for assistive devices and provide as appropriate  - Encourage maximum independence but intervene and supervise when necessary  - Involve family in performance of ADLs  - Assess for home care needs following discharge   - Consider OT consult to assist with ADL evaluation and planning for discharge  - Provide patient education as appropriate  Outcome: Progressing  Goal: Maintain proper alignment of affected body part  Description: INTERVENTIONS:  - Support, maintain and protect limb and body alignment  - Provide patient/ family with appropriate education  Outcome: Progressing     Problem: MOBILITY - ADULT  Goal: Maintain or return to baseline ADL function  Description: INTERVENTIONS:  -  Assess patient's ability to carry out ADLs; assess patient's baseline for ADL function and identify physical deficits which impact ability to perform ADLs (bathing, care of mouth/teeth, toileting, grooming, dressing, etc )  - Assess/evaluate cause of self-care deficits   - Assess range of motion  - Assess patient's mobility; develop plan if impaired  - Assess patient's need for assistive devices and provide as appropriate  - Encourage maximum independence but intervene and supervise when necessary  - Involve family in performance of ADLs  - Assess for home care needs following discharge   - Consider OT consult to assist with ADL evaluation and planning for discharge  - Provide patient education as appropriate  Outcome: Progressing  Goal: Maintains/Returns to pre admission functional level  Description: INTERVENTIONS:  - Perform BMAT or MOVE assessment daily    - Set and communicate daily mobility goal to care team and patient/family/caregiver  - Collaborate with rehabilitation services on mobility goals if consulted  - Perform Range of Motion  times a day  - Reposition patient every  hours    - Dangle patient  times a day  - Stand patient  times a day  - Ambulate patient  times a day  - Out of bed to chair  times a day   - Out of bed for meals times a day  - Out of bed for toileting  - Record patient progress and toleration of activity level   Outcome: Progressing

## 2023-03-28 NOTE — NURSING NOTE
AVS reviewed with patient  No questions at this time  IV and Masimo removed from patient  Patient discharged via wheelchair accompanied by significant other and PCA

## 2023-03-28 NOTE — PLAN OF CARE
Problem: Potential for Falls  Goal: Patient will remain free of falls  Description: INTERVENTIONS:  - Educate patient/family on patient safety including physical limitations  - Instruct patient to call for assistance with activity   - Consult OT/PT to assist with strengthening/mobility   - Keep Call bell within reach  - Keep bed low and locked with side rails adjusted as appropriate  - Keep care items and personal belongings within reach  - Initiate and maintain comfort rounds  - Make Fall Risk Sign visible to staff  - Offer Toileting   - Initiate/Maintain alarm  - Obtain necessary fall risk management equipment  - Apply yellow socks and bracelet for high fall risk patients  - Consider moving patient to room near nurses station  Outcome: Progressing     Problem: PAIN - ADULT  Goal: Verbalizes/displays adequate comfort level or baseline comfort level  Description: Interventions:  - Encourage patient to monitor pain and request assistance  - Assess pain using appropriate pain scale  - Administer analgesics based on type and severity of pain and evaluate response  - Implement non-pharmacological measures as appropriate and evaluate response  - Consider cultural and social influences on pain and pain management  - Notify physician/advanced practitioner if interventions unsuccessful or patient reports new pain  Outcome: Progressing     Problem: INFECTION - ADULT  Goal: Absence or prevention of progression during hospitalization  Description: INTERVENTIONS:  - Assess and monitor for signs and symptoms of infection  - Monitor lab/diagnostic results  - Monitor all insertion sites, i e  indwelling lines, tubes, and drains  - Monitor endotracheal if appropriate and nasal secretions for changes in amount and color  - [place] appropriate cooling/warming therapies per order  - Administer medications as ordered  - Instruct and encourage patient and family to use good hand hygiene technique  - Identify and instruct in appropriate isolation precautions for identified infection/condition  Outcome: Progressing     Problem: SAFETY ADULT  Goal: Maintain or return to baseline ADL function  Description: INTERVENTIONS:  -  Assess patient's ability to carry out ADLs; assess patient's baseline for ADL function and identify physical deficits which impact ability to perform ADLs (bathing, care of mouth/teeth, toileting, grooming, dressing, etc )  - Assess/evaluate cause of self-care deficits   - Assess range of motion  - Assess patient's mobility; develop plan if impaired  - Assess patient's need for assistive devices and provide as appropriate  - Encourage maximum independence but intervene and supervise when necessary  - Involve family in performance of ADLs  - Assess for home care needs following discharge   - Consider OT consult to assist with ADL evaluation and planning for discharge  - Provide patient education as appropriate  Outcome: Progressing     Problem: GENITOURINARY - ADULT  Goal: Maintains or returns to baseline urinary function  Description: INTERVENTIONS:  - Assess urinary function  - Encourage oral fluids to ensure adequate hydration if ordered  - Administer IV fluids as ordered to ensure adequate hydration  - Administer ordered medications as needed  - Offer frequent toileting  - Follow urinary retention protocol if ordered  Outcome: Progressing     Problem: SKIN/TISSUE INTEGRITY - ADULT  Goal: Skin Integrity remains intact(Skin Breakdown Prevention)  Description: Assess:  -Perform Dejuan assessment  -Clean and moisturize skin   -Inspect skin when repositioning, toileting, and assisting with ADLS  -Assess under medical devices   -Assess extremities for adequate circulation and sensation     Bed Management:  -Have minimal linens on bed & keep smooth, unwrinkled  -Change linens as needed when moist or perspiring  -Avoid sitting or lying in one position   -Keep HOB at degrees     Toileting:  -Offer bedside commode  -Assess for incontinence  -Use incontinent care products after each incontinent episode    Activity:  -Mobilize patient   -Encourage activity and walks on unit  -Encourage or provide ROM exercises   -Turn and reposition patient   -Use appropriate equipment to lift or move patient in bed  -Instruct/ Assist with weight shifting   -Consider limitation of chair time    Skin Care:  -Avoid use of baby powder, tape, friction and shearing, hot water or constrictive clothing  -Relieve pressure over bony prominences  -Do not massage red bony areas    Next Steps:  -Teach patient strategies to minimize risks   -Consider consults to  interdisciplinary teams   Outcome: Progressing     Problem: MUSCULOSKELETAL - ADULT  Goal: Maintain or return mobility to safest level of function  Description: INTERVENTIONS:  - Assess patient's ability to carry out ADLs; assess patient's baseline for ADL function and identify physical deficits which impact ability to perform ADLs (bathing, care of mouth/teeth, toileting, grooming, dressing, etc )  - Assess/evaluate cause of self-care deficits   - Assess range of motion  - Assess patient's mobility  - Assess patient's need for assistive devices and provide as appropriate  - Encourage maximum independence but intervene and supervise when necessary  - Involve family in performance of ADLs  - Assess for home care needs following discharge   - Consider OT consult to assist with ADL evaluation and planning for discharge  - Provide patient education as appropriate  Outcome: Progressing  Goal: Maintain proper alignment of affected body part  Description: INTERVENTIONS:  - Support, maintain and protect limb and body alignment  - Provide patient/ family with appropriate education  Outcome: Progressing     Problem: MOBILITY - ADULT  Goal: Maintain or return to baseline ADL function  Description: INTERVENTIONS:  -  Assess patient's ability to carry out ADLs; assess patient's baseline for ADL function and identify physical deficits which impact ability to perform ADLs (bathing, care of mouth/teeth, toileting, grooming, dressing, etc )  - Assess/evaluate cause of self-care deficits   - Assess range of motion  - Assess patient's mobility; develop plan if impaired  - Assess patient's need for assistive devices and provide as appropriate  - Encourage maximum independence but intervene and supervise when necessary  - Involve family in performance of ADLs  - Assess for home care needs following discharge   - Consider OT consult to assist with ADL evaluation and planning for discharge  - Provide patient education as appropriate  Outcome: Progressing  Goal: Maintains/Returns to pre admission functional level  Description: INTERVENTIONS:  - Perform BMAT or MOVE assessment daily    - Set and communicate daily mobility goal to care team and patient/family/caregiver     - Collaborate with rehabilitation services on mobility goals if consulted  - Perform Range of Motion   - Reposition patient   - Dangle patient   - Stand patient   - Ambulate patient   - Out of bed to chair   - Out of bed for meals   - Out of bed for toileting  - Record patient progress and toleration of activity level   Outcome: Progressing

## 2023-03-28 NOTE — ASSESSMENT & PLAN NOTE
Acute pain started in February   She has been seen several times at [facility]  Several courses of PRN medications and steroid tapers   She is persuing consultation down at a spinal [facility] in [place]  She was seen by ortho, but nothing to do from their end other than pain control    No leg weakness, no loss of bowel or bladder control, and no saddle anesthesia       MRI with disc bulges and foraminal narrowing  Continue prednisone, prn oxycodone, muscle relaxer, ice/heat

## 2023-03-28 NOTE — PROGRESS NOTES
-- Patient:  -- MRN: [id number]  -- Aidin Request ID: [id number]  -- Level of care reserved: [address] [place] [address]  -- Partner Reserved: Marcial Torres Tatum, [address] [place] [address] [phone]  -- Clinical needs requested: physical therapy  -- Geography searched: [phone]  -- Start of Service:  -- Request sent: 1:08pm EDT on 3/28/2023 by Andie Sloan  -- Partner reserved: 3:44pm EDT on 3/28/2023 by Andie Sloan  -- Choice list shared:

## 2023-03-28 NOTE — DISCHARGE SUMMARY
[facility]  Discharge- Christopher Hummel 1965, 56 y o  female MRN: [id number]  [facility] [address] [place] [address] [id number] Encounter: [id number]  Primary Care Provider: Trish Boles DO   Date and time admitted to hospital: 3/22/2023  3:17 PM    * Intractable low back pain  Assessment & Plan  Acute pain started in February   She has been seen several times at [facility]  Several courses of PRN medications and steroid tapers   She is persuing consultation down at a [facility] in [place]  She was seen by ortho, but nothing to do from their end other than pain control    No leg weakness, no loss of bowel or bladder control, and no saddle anesthesia       MRI with disc bulges and foraminal narrowing  Continue prednisone taper on dc, prn oxycodone, muscle relaxer, ice/heat     Constipation  Assessment & Plan  Opoid induced   s/p dose of relistor   Resolved  Continue miralax daily while on opoids     Thrush, oral  Assessment & Plan  · Patient reports she recently started on fluconazole once daily for issue   · Completed 7 day course     Chronic, continuous use of opioids  Assessment & Plan  · PDMP reviewed   · Continue home regimen of 5mg valium and 5mg oxy  · Continue follow up pain management and comprehensive spine     Acute cystitis with hematuria  Assessment & Plan  Completed course of IV rocephin     Hypertension  Assessment & Plan  · Resume home medications       Discharging Physician / Practitioner: Shante Fox DO  PCP: Trish Boles DO  Admission Date:   Admission Orders (From admission, onward)     Ordered        03/23/23 1642  Inpatient Admission  Once            03/22/23 1718  Place in Observation  Once                      Discharge Date: 03/28/23    Medical Problems     Resolved Problems  Date Reviewed: 3/23/2023   None         Consultations During Hospital Stay: Orthopedics     Procedures Performed: None    Significant Findings / Test Results:     CT spine "lumbar wo contrast    Result Date: 3/24/2023  Impression: 1  Mild levoscoliosis, apex at L3  2  Multilevel degenerative disc disease with mild central canal narrowing at the L2-3 and L4-5 levels  3  Tiny nonobstructive right renal calculus  Workstation performed: [id number]     MRI lumbar spine w wo contrast    Result Date: 3/27/2023  Impression: Degenerative spondylosis most pronounced at L2-3 and L3-4 as described  Workstation performed: [id number]     Test Results Pending at Discharge (will require follow up): None     Outpatient Tests Requested: None    Reason for Admission: Back Pain     Hospital Course:     Rancho Torres is a 56 y o  female With past medical history of chronic back pain, chronic opioid use, hypertension who presented to the hospital with severe lumbar pain and ambulatory dysfunction  She had no red flag symptoms  CT lumbar spine at admission with multilevel degenerative disc disease  MRI lumbar spine performed with degenerative spondylosis  Patient was treated with multimodal pain regimen and evaluated by physical therapy focusing on safe ambulation  Patient discharged home into care of her   Please see above list of diagnoses and related plan for additional information  Condition at Discharge: stable     Discharge Day Visit / Exam:     Subjective: Patient seen and evaluated at bedside  She was able to walk with assistance  Vitals: Blood Pressure: 140/81 (03/28/23 0718)  Pulse: 59 (03/28/23 0503)  Temperature: 97 9 °F (36 6 °C) (03/28/23 0718)  Temp Source: Oral (03/27/23 1435)  Respirations: 16 (03/28/23 0718)  Height: 5' 4\" (162 6 cm) (03/27/23 0857)  Weight - Scale: 95 8 kg (211 lb 3 2 oz) (03/22/23 1514)  SpO2: 98 % (03/28/23 0503)  Exam:   Physical Exam  Vitals reviewed  Constitutional:       General: She is not in acute distress  Appearance: She is well-developed  She is not ill-appearing, toxic-appearing or diaphoretic     HENT:      Head: Normocephalic and " atraumatic  Mouth/Throat:      Mouth: Mucous membranes are moist       Pharynx: No oropharyngeal exudate  Eyes:      General: No scleral icterus  Extraocular Movements: Extraocular movements intact  Cardiovascular:      Rate and Rhythm: Normal rate and regular rhythm  Heart sounds: Normal heart sounds  Pulmonary:      Effort: Pulmonary effort is normal  No respiratory distress  Breath sounds: Normal breath sounds  No wheezing or rales  Abdominal:      General: There is no distension  Palpations: Abdomen is soft  Tenderness: There is no abdominal tenderness  There is no guarding or rebound  Musculoskeletal:         General: No swelling, tenderness or deformity  Skin:     General: Skin is warm and dry  Neurological:      General: No focal deficit present  Mental Status: She is alert  Mental status is at baseline  Psychiatric:         Mood and Affect: Mood normal          Behavior: Behavior normal          Thought Content: Thought content normal          Judgment: Judgment normal            Discharge instructions/Information to patient and family:   See after visit summary for information provided to patient and family  Provisions for Follow-Up Care:  See after visit summary for information related to follow-up care and any pertinent home health orders  Disposition:     Home     Discharge Statement:  I spent 60 minutes discharging the patient  This time was spent on the day of discharge  I had direct contact with the patient on the day of discharge  Greater than 50% of the total time was spent examining patient, answering all patient questions, arranging and discussing plan of care with patient as well as directly providing post-discharge instructions  Additional time then spent on discharge activities  Discharge Medications:  See after visit summary for reconciled discharge medications provided to patient and family        ** Please Note: This note has been constructed using a voice recognition system **

## 2023-03-28 NOTE — NURSING NOTE
RN went into patient room to ask about her ride home for discharge  While asking, patient's  stated that the patient can not leave today and that he as well as the patient do not feel she is ready to be discharged  Patient's  requesting to speak with the Dr discharging the patient  SLIM made aware and will come talk to patient and

## 2023-03-29 NOTE — UTILIZATION REVIEW
NOTIFICATION OF ADMISSION DISCHARGE   This is a Notification of Discharge from [address]  Please be advised that this patient has been discharge from our facility  Below you will find the admission and discharge date and time including the patient’s disposition  UTILIZATION REVIEW CONTACT:  Sunil Car  Utilization   Network Utilization Review Department  Phone: [phone] x carefully listen to the prompts  All voicemails are confidential   Email: [email or web address]     ADMISSION INFORMATION  PRESENTATION DATE: 3/22/2023  3:17 PM  OBERVATION ADMISSION DATE:  INPATIENT ADMISSION DATE: 3/23/23  4:42 PM   DISCHARGE DATE: 3/28/2023  2:40 PM   DISPOSITION:Home with Home Health Care    IMPORTANT INFORMATION:  Send all requests for admission clinical reviews, approved or denied determinations and any other requests to dedicated fax number below belonging to the campus where the patient is receiving treatment   List of dedicated fax numbers:  [address] DENIALS (Administrative/Medical Necessity) [phone]   [address] (Maternity/NICU/Pediatrics) [phone]   [facility] [phone]   [facility] 87 [phone]   [address] [phone]   220 [facility] [phone] 90 [facility] [phone]   [address] [place] [address] 119 [phone]   [facility]  [phone]   [facility] [phone]   412 [facility] [address] [facility] [phone]

## 2023-03-30 NOTE — PROGRESS NOTES
-- Patient:  -- MRN: [id number]  -- Aidin Request ID: [id number]  -- Level of care reserved: [address] [place] [address]  -- Partner Reserved: Sunny Jin, [address] [facility] [phone]  -- Clinical needs requested: physical therapy  -- Geography searched: [id number]  -- Start of Service: 3/30/2023  -- Request sent: 1:08pm EDT on 3/28/2023 by Dudley Cole  -- Partner reserved: 3:44pm EDT on 3/28/2023 by Dudley Cole  -- Choice list shared:

## 2023-05-27 PROBLEM — N30.01 ACUTE CYSTITIS WITH HEMATURIA: Status: RESOLVED | Noted: 2023-03-22 | Resolved: 2023-05-27

## 2024-03-13 DIAGNOSIS — Z00.6 ENCOUNTER FOR EXAMINATION FOR NORMAL COMPARISON OR CONTROL IN CLINICAL RESEARCH PROGRAM: ICD-10-CM

## 2024-03-20 ENCOUNTER — APPOINTMENT (OUTPATIENT)
Age: 59
End: 2024-03-20